# Patient Record
Sex: MALE | Race: WHITE | Employment: FULL TIME | URBAN - METROPOLITAN AREA
[De-identification: names, ages, dates, MRNs, and addresses within clinical notes are randomized per-mention and may not be internally consistent; named-entity substitution may affect disease eponyms.]

---

## 2020-02-15 ENCOUNTER — APPOINTMENT (OUTPATIENT)
Dept: NON INVASIVE DIAGNOSTICS | Age: 60
DRG: 836 | End: 2020-02-15
Attending: INTERNAL MEDICINE
Payer: COMMERCIAL

## 2020-02-15 ENCOUNTER — HOSPITAL ENCOUNTER (INPATIENT)
Age: 60
LOS: 3 days | Discharge: HOME | DRG: 836 | End: 2020-02-18
Attending: STUDENT IN AN ORGANIZED HEALTH CARE EDUCATION/TRAINING PROGRAM | Admitting: INTERNAL MEDICINE
Payer: COMMERCIAL

## 2020-02-15 ENCOUNTER — APPOINTMENT (OUTPATIENT)
Dept: GENERAL RADIOLOGY | Age: 60
DRG: 836 | End: 2020-02-15
Attending: STUDENT IN AN ORGANIZED HEALTH CARE EDUCATION/TRAINING PROGRAM
Payer: COMMERCIAL

## 2020-02-15 ENCOUNTER — APPOINTMENT (OUTPATIENT)
Dept: CT IMAGING | Age: 60
DRG: 836 | End: 2020-02-15
Attending: INTERNAL MEDICINE
Payer: COMMERCIAL

## 2020-02-15 DIAGNOSIS — R16.1 SPLENOMEGALY: ICD-10-CM

## 2020-02-15 DIAGNOSIS — D69.6 THROMBOCYTOPENIA (HCC): ICD-10-CM

## 2020-02-15 DIAGNOSIS — D64.9 SYMPTOMATIC ANEMIA: Primary | ICD-10-CM

## 2020-02-15 DIAGNOSIS — D64.9 ANEMIA, UNSPECIFIED TYPE: ICD-10-CM

## 2020-02-15 LAB
ALBUMIN SERPL-MCNC: 3.7 G/DL (ref 3.5–5)
ALBUMIN/GLOB SERPL: 1 {RATIO} (ref 1.1–2.2)
ALP SERPL-CCNC: 89 U/L (ref 45–117)
ALT SERPL-CCNC: 37 U/L (ref 12–78)
ANION GAP SERPL CALC-SCNC: 6 MMOL/L (ref 5–15)
AST SERPL-CCNC: 19 U/L (ref 15–37)
B PERT DNA SPEC QL NAA+PROBE: NOT DETECTED
BASOPHILS # BLD: 0 K/UL (ref 0–0.1)
BASOPHILS # BLD: 0 K/UL (ref 0–0.1)
BASOPHILS NFR BLD: 0 % (ref 0–1)
BASOPHILS NFR BLD: 0 % (ref 0–1)
BILIRUB DIRECT SERPL-MCNC: 0.3 MG/DL (ref 0–0.2)
BILIRUB INDIRECT SERPL-MCNC: 0.7 MG/DL (ref 0–1.1)
BILIRUB SERPL-MCNC: 1 MG/DL (ref 0.2–1)
BILIRUB SERPL-MCNC: 1.2 MG/DL (ref 0.2–1)
BNP SERPL-MCNC: 316 PG/ML
BORDETELLA PARAPERTUSSIS PCR, BORPAR: NOT DETECTED
BUN SERPL-MCNC: 16 MG/DL (ref 6–20)
BUN/CREAT SERPL: 13 (ref 12–20)
C PNEUM DNA SPEC QL NAA+PROBE: NOT DETECTED
CALCIUM SERPL-MCNC: 8.5 MG/DL (ref 8.5–10.1)
CHLORIDE SERPL-SCNC: 109 MMOL/L (ref 97–108)
CO2 SERPL-SCNC: 24 MMOL/L (ref 21–32)
COMMENT, HOLDF: NORMAL
COMMENT, HOLDF: NORMAL
CREAT SERPL-MCNC: 1.19 MG/DL (ref 0.7–1.3)
D DIMER PPP FEU-MCNC: 1.62 MG/L FEU (ref 0–0.65)
DIFFERENTIAL METHOD BLD: ABNORMAL
DIFFERENTIAL METHOD BLD: ABNORMAL
EOSINOPHIL # BLD: 0 K/UL (ref 0–0.4)
EOSINOPHIL # BLD: 0 K/UL (ref 0–0.4)
EOSINOPHIL NFR BLD: 0 % (ref 0–7)
EOSINOPHIL NFR BLD: 0 % (ref 0–7)
ERYTHROCYTE [DISTWIDTH] IN BLOOD BY AUTOMATED COUNT: 12.1 % (ref 11.5–14.5)
ERYTHROCYTE [DISTWIDTH] IN BLOOD BY AUTOMATED COUNT: 12.6 % (ref 11.5–14.5)
FERRITIN SERPL-MCNC: 597 NG/ML (ref 26–388)
FLUAV H1 2009 PAND RNA SPEC QL NAA+PROBE: NOT DETECTED
FLUAV H1 RNA SPEC QL NAA+PROBE: NOT DETECTED
FLUAV H3 RNA SPEC QL NAA+PROBE: NOT DETECTED
FLUAV SUBTYP SPEC NAA+PROBE: NOT DETECTED
FLUBV RNA SPEC QL NAA+PROBE: NOT DETECTED
FOLATE SERPL-MCNC: 6.3 NG/ML (ref 5–21)
GLOBULIN SER CALC-MCNC: 3.6 G/DL (ref 2–4)
GLUCOSE SERPL-MCNC: 153 MG/DL (ref 65–100)
HADV DNA SPEC QL NAA+PROBE: NOT DETECTED
HAPTOGLOB SERPL-MCNC: 179 MG/DL (ref 30–200)
HCOV 229E RNA SPEC QL NAA+PROBE: NOT DETECTED
HCOV HKU1 RNA SPEC QL NAA+PROBE: NOT DETECTED
HCOV NL63 RNA SPEC QL NAA+PROBE: NOT DETECTED
HCOV OC43 RNA SPEC QL NAA+PROBE: NOT DETECTED
HCT VFR BLD AUTO: 15.9 % (ref 36.6–50.3)
HCT VFR BLD AUTO: 16.5 % (ref 36.6–50.3)
HEMOCCULT STL QL: NEGATIVE
HGB BLD-MCNC: 5.5 G/DL (ref 12.1–17)
HGB BLD-MCNC: 5.7 G/DL (ref 12.1–17)
HMPV RNA SPEC QL NAA+PROBE: NOT DETECTED
HPIV1 RNA SPEC QL NAA+PROBE: NOT DETECTED
HPIV2 RNA SPEC QL NAA+PROBE: NOT DETECTED
HPIV3 RNA SPEC QL NAA+PROBE: NOT DETECTED
HPIV4 RNA SPEC QL NAA+PROBE: NOT DETECTED
IMM GRANULOCYTES # BLD AUTO: 0 K/UL
IMM GRANULOCYTES # BLD AUTO: 0 K/UL (ref 0–0.04)
IMM GRANULOCYTES NFR BLD AUTO: 0 %
IMM GRANULOCYTES NFR BLD AUTO: 0 % (ref 0–0.5)
INR PPP: 1.3 (ref 0.9–1.1)
IRON SATN MFR SERPL: 62 % (ref 20–50)
IRON SERPL-MCNC: 123 UG/DL (ref 35–150)
LDH SERPL L TO P-CCNC: 374 U/L (ref 85–241)
LYMPHOCYTES # BLD: 0.7 K/UL (ref 0.8–3.5)
LYMPHOCYTES # BLD: 0.9 K/UL (ref 0.8–3.5)
LYMPHOCYTES NFR BLD: 14 % (ref 12–49)
LYMPHOCYTES NFR BLD: 18 % (ref 12–49)
M PNEUMO DNA SPEC QL NAA+PROBE: NOT DETECTED
MAGNESIUM SERPL-MCNC: 2.1 MG/DL (ref 1.6–2.4)
MCH RBC QN AUTO: 30.6 PG (ref 26–34)
MCH RBC QN AUTO: 31.1 PG (ref 26–34)
MCHC RBC AUTO-ENTMCNC: 34.5 G/DL (ref 30–36.5)
MCHC RBC AUTO-ENTMCNC: 34.6 G/DL (ref 30–36.5)
MCV RBC AUTO: 88.3 FL (ref 80–99)
MCV RBC AUTO: 90.2 FL (ref 80–99)
MONOCYTES # BLD: 0.3 K/UL (ref 0–1)
MONOCYTES # BLD: 0.7 K/UL (ref 0–1)
MONOCYTES NFR BLD: 14 % (ref 5–13)
MONOCYTES NFR BLD: 7 % (ref 5–13)
NEUTS SEG # BLD: 3.5 K/UL (ref 1.8–8)
NEUTS SEG # BLD: 3.9 K/UL (ref 1.8–8)
NEUTS SEG NFR BLD: 68 % (ref 32–75)
NEUTS SEG NFR BLD: 79 % (ref 32–75)
NRBC # BLD: 0 K/UL (ref 0–0.01)
NRBC # BLD: 0 K/UL (ref 0–0.01)
NRBC BLD-RTO: 0 PER 100 WBC
NRBC BLD-RTO: 0 PER 100 WBC
PLATELET # BLD AUTO: 21 K/UL (ref 150–400)
PLATELET # BLD AUTO: 25 K/UL (ref 150–400)
PMV BLD AUTO: 11 FL (ref 8.9–12.9)
PMV BLD AUTO: 11.6 FL (ref 8.9–12.9)
POTASSIUM SERPL-SCNC: 3.8 MMOL/L (ref 3.5–5.1)
PROT SERPL-MCNC: 7.3 G/DL (ref 6.4–8.2)
PROTHROMBIN TIME: 13.2 SEC (ref 9–11.1)
RBC # BLD AUTO: 1.8 M/UL (ref 4.1–5.7)
RBC # BLD AUTO: 1.83 M/UL (ref 4.1–5.7)
RBC MORPH BLD: ABNORMAL
RBC MORPH BLD: ABNORMAL
RSV RNA SPEC QL NAA+PROBE: NOT DETECTED
RV+EV RNA SPEC QL NAA+PROBE: NOT DETECTED
SAMPLES BEING HELD,HOLD: NORMAL
SAMPLES BEING HELD,HOLD: NORMAL
SODIUM SERPL-SCNC: 139 MMOL/L (ref 136–145)
TIBC SERPL-MCNC: 199 UG/DL (ref 250–450)
TROPONIN I SERPL-MCNC: <0.05 NG/ML
VIT B12 SERPL-MCNC: 1817 PG/ML (ref 193–986)
WBC # BLD AUTO: 4.9 K/UL (ref 4.1–11.1)
WBC # BLD AUTO: 5.1 K/UL (ref 4.1–11.1)

## 2020-02-15 PROCEDURE — 82272 OCCULT BLD FECES 1-3 TESTS: CPT

## 2020-02-15 PROCEDURE — 83010 ASSAY OF HAPTOGLOBIN QUANT: CPT

## 2020-02-15 PROCEDURE — 83540 ASSAY OF IRON: CPT

## 2020-02-15 PROCEDURE — 85025 COMPLETE CBC W/AUTO DIFF WBC: CPT

## 2020-02-15 PROCEDURE — 83880 ASSAY OF NATRIURETIC PEPTIDE: CPT

## 2020-02-15 PROCEDURE — 93306 TTE W/DOPPLER COMPLETE: CPT

## 2020-02-15 PROCEDURE — 74011000250 HC RX REV CODE- 250: Performed by: STUDENT IN AN ORGANIZED HEALTH CARE EDUCATION/TRAINING PROGRAM

## 2020-02-15 PROCEDURE — 82728 ASSAY OF FERRITIN: CPT

## 2020-02-15 PROCEDURE — 82248 BILIRUBIN DIRECT: CPT

## 2020-02-15 PROCEDURE — 36430 TRANSFUSION BLD/BLD COMPNT: CPT

## 2020-02-15 PROCEDURE — 85379 FIBRIN DEGRADATION QUANT: CPT

## 2020-02-15 PROCEDURE — 84484 ASSAY OF TROPONIN QUANT: CPT

## 2020-02-15 PROCEDURE — 74011636320 HC RX REV CODE- 636/320: Performed by: RADIOLOGY

## 2020-02-15 PROCEDURE — 86923 COMPATIBILITY TEST ELECTRIC: CPT

## 2020-02-15 PROCEDURE — 36415 COLL VENOUS BLD VENIPUNCTURE: CPT

## 2020-02-15 PROCEDURE — 80053 COMPREHEN METABOLIC PANEL: CPT

## 2020-02-15 PROCEDURE — 99285 EMERGENCY DEPT VISIT HI MDM: CPT

## 2020-02-15 PROCEDURE — 82607 VITAMIN B-12: CPT

## 2020-02-15 PROCEDURE — C9113 INJ PANTOPRAZOLE SODIUM, VIA: HCPCS | Performed by: STUDENT IN AN ORGANIZED HEALTH CARE EDUCATION/TRAINING PROGRAM

## 2020-02-15 PROCEDURE — 86900 BLOOD TYPING SEROLOGIC ABO: CPT

## 2020-02-15 PROCEDURE — P9035 PLATELET PHERES LEUKOREDUCED: HCPCS

## 2020-02-15 PROCEDURE — P9016 RBC LEUKOCYTES REDUCED: HCPCS

## 2020-02-15 PROCEDURE — 74011250636 HC RX REV CODE- 250/636: Performed by: STUDENT IN AN ORGANIZED HEALTH CARE EDUCATION/TRAINING PROGRAM

## 2020-02-15 PROCEDURE — 85610 PROTHROMBIN TIME: CPT

## 2020-02-15 PROCEDURE — 74178 CT ABD&PLV WO CNTR FLWD CNTR: CPT

## 2020-02-15 PROCEDURE — 74011000250 HC RX REV CODE- 250: Performed by: INTERNAL MEDICINE

## 2020-02-15 PROCEDURE — 96375 TX/PRO/DX INJ NEW DRUG ADDON: CPT

## 2020-02-15 PROCEDURE — C9113 INJ PANTOPRAZOLE SODIUM, VIA: HCPCS | Performed by: INTERNAL MEDICINE

## 2020-02-15 PROCEDURE — 0100U RESPIRATORY PANEL,PCR,NASOPHARYNGEAL: CPT

## 2020-02-15 PROCEDURE — 86038 ANTINUCLEAR ANTIBODIES: CPT

## 2020-02-15 PROCEDURE — 74011000258 HC RX REV CODE- 258: Performed by: RADIOLOGY

## 2020-02-15 PROCEDURE — 96374 THER/PROPH/DIAG INJ IV PUSH: CPT

## 2020-02-15 PROCEDURE — 71046 X-RAY EXAM CHEST 2 VIEWS: CPT

## 2020-02-15 PROCEDURE — 83735 ASSAY OF MAGNESIUM: CPT

## 2020-02-15 PROCEDURE — 74011250637 HC RX REV CODE- 250/637: Performed by: INTERNAL MEDICINE

## 2020-02-15 PROCEDURE — 82746 ASSAY OF FOLIC ACID SERUM: CPT

## 2020-02-15 PROCEDURE — 83615 LACTATE (LD) (LDH) ENZYME: CPT

## 2020-02-15 PROCEDURE — 74011250636 HC RX REV CODE- 250/636: Performed by: INTERNAL MEDICINE

## 2020-02-15 PROCEDURE — 65660000000 HC RM CCU STEPDOWN

## 2020-02-15 PROCEDURE — 93005 ELECTROCARDIOGRAM TRACING: CPT

## 2020-02-15 PROCEDURE — 30233N1 TRANSFUSION OF NONAUTOLOGOUS RED BLOOD CELLS INTO PERIPHERAL VEIN, PERCUTANEOUS APPROACH: ICD-10-PCS | Performed by: INTERNAL MEDICINE

## 2020-02-15 RX ORDER — TRAMADOL HYDROCHLORIDE 50 MG/1
50 TABLET ORAL
Status: DISCONTINUED | OUTPATIENT
Start: 2020-02-15 | End: 2020-02-18 | Stop reason: HOSPADM

## 2020-02-15 RX ORDER — ACETAMINOPHEN 325 MG/1
650 TABLET ORAL
Status: DISCONTINUED | OUTPATIENT
Start: 2020-02-15 | End: 2020-02-18 | Stop reason: HOSPADM

## 2020-02-15 RX ORDER — ONDANSETRON 2 MG/ML
4 INJECTION INTRAMUSCULAR; INTRAVENOUS
Status: DISCONTINUED | OUTPATIENT
Start: 2020-02-15 | End: 2020-02-18 | Stop reason: HOSPADM

## 2020-02-15 RX ORDER — SODIUM CHLORIDE 0.9 % (FLUSH) 0.9 %
10 SYRINGE (ML) INJECTION
Status: COMPLETED | OUTPATIENT
Start: 2020-02-15 | End: 2020-02-15

## 2020-02-15 RX ORDER — SODIUM CHLORIDE 9 MG/ML
75 INJECTION, SOLUTION INTRAVENOUS CONTINUOUS
Status: DISCONTINUED | OUTPATIENT
Start: 2020-02-15 | End: 2020-02-18

## 2020-02-15 RX ORDER — DORZOLAMIDE HCL 20 MG/ML
1 SOLUTION/ DROPS OPHTHALMIC 3 TIMES DAILY
Status: DISCONTINUED | OUTPATIENT
Start: 2020-02-15 | End: 2020-02-18 | Stop reason: HOSPADM

## 2020-02-15 RX ORDER — MELOXICAM 15 MG/1
15 TABLET ORAL DAILY
COMMUNITY
End: 2020-02-18

## 2020-02-15 RX ORDER — SODIUM CHLORIDE 9 MG/ML
250 INJECTION, SOLUTION INTRAVENOUS AS NEEDED
Status: DISCONTINUED | OUTPATIENT
Start: 2020-02-15 | End: 2020-02-18 | Stop reason: HOSPADM

## 2020-02-15 RX ORDER — NEBIVOLOL 2.5 MG/1
5 TABLET ORAL DAILY
Status: DISCONTINUED | OUTPATIENT
Start: 2020-02-16 | End: 2020-02-18 | Stop reason: HOSPADM

## 2020-02-15 RX ORDER — TIMOLOL MALEATE 5 MG/ML
1 SOLUTION/ DROPS OPHTHALMIC 2 TIMES DAILY
Status: DISCONTINUED | OUTPATIENT
Start: 2020-02-15 | End: 2020-02-18 | Stop reason: HOSPADM

## 2020-02-15 RX ORDER — ONDANSETRON 2 MG/ML
4 INJECTION INTRAMUSCULAR; INTRAVENOUS
Status: COMPLETED | OUTPATIENT
Start: 2020-02-15 | End: 2020-02-15

## 2020-02-15 RX ORDER — NEBIVOLOL 5 MG/1
5 TABLET ORAL DAILY
COMMUNITY

## 2020-02-15 RX ORDER — SODIUM CHLORIDE 0.9 % (FLUSH) 0.9 %
5-40 SYRINGE (ML) INJECTION EVERY 8 HOURS
Status: DISCONTINUED | OUTPATIENT
Start: 2020-02-15 | End: 2020-02-18 | Stop reason: HOSPADM

## 2020-02-15 RX ORDER — SODIUM CHLORIDE 0.9 % (FLUSH) 0.9 %
5-40 SYRINGE (ML) INJECTION AS NEEDED
Status: DISCONTINUED | OUTPATIENT
Start: 2020-02-15 | End: 2020-02-18 | Stop reason: HOSPADM

## 2020-02-15 RX ORDER — TRAMADOL HYDROCHLORIDE 50 MG/1
50 TABLET ORAL
COMMUNITY

## 2020-02-15 RX ADMIN — ACETAMINOPHEN 650 MG: 325 TABLET ORAL at 15:25

## 2020-02-15 RX ADMIN — SODIUM CHLORIDE, SODIUM LACTATE, POTASSIUM CHLORIDE, AND CALCIUM CHLORIDE 1000 ML: 600; 310; 30; 20 INJECTION, SOLUTION INTRAVENOUS at 09:59

## 2020-02-15 RX ADMIN — Medication 10 ML: at 21:27

## 2020-02-15 RX ADMIN — SODIUM CHLORIDE 40 MG: 9 INJECTION INTRAMUSCULAR; INTRAVENOUS; SUBCUTANEOUS at 21:26

## 2020-02-15 RX ADMIN — SODIUM CHLORIDE 80 MG: 9 INJECTION INTRAMUSCULAR; INTRAVENOUS; SUBCUTANEOUS at 10:01

## 2020-02-15 RX ADMIN — SODIUM CHLORIDE 75 ML/HR: 900 INJECTION, SOLUTION INTRAVENOUS at 18:48

## 2020-02-15 RX ADMIN — Medication 10 ML: at 11:45

## 2020-02-15 RX ADMIN — DORZOLAMIDE HYDROCHLORIDE 1 DROP: 20 SOLUTION/ DROPS OPHTHALMIC at 16:27

## 2020-02-15 RX ADMIN — DORZOLAMIDE HYDROCHLORIDE 1 DROP: 20 SOLUTION/ DROPS OPHTHALMIC at 21:32

## 2020-02-15 RX ADMIN — ONDANSETRON 4 MG: 2 INJECTION INTRAMUSCULAR; INTRAVENOUS at 10:00

## 2020-02-15 RX ADMIN — IOPAMIDOL 100 ML: 755 INJECTION, SOLUTION INTRAVENOUS at 11:45

## 2020-02-15 RX ADMIN — SODIUM CHLORIDE 100 ML: 900 INJECTION, SOLUTION INTRAVENOUS at 11:45

## 2020-02-15 NOTE — H&P
HISTORY AND PHYSICAL      PCP: iB Razo MD  History source: Patient and his wife      CC: Shortness of breath      HPI: A 62year old male patient with PMH of HTN and arthritis presented to ED for progressive worsening of shortness since 1 month. Patient has been sob more than 1 month which has significantly worsened since last week. He is sob with minimal exertion. Some chest discomfort with sob and he becomes tachycardiac. C/o nausea since 1 month and poor appetite. He was off training class in [place] this last week, initially constipated and used OTC laxative and he had 2-3 episodes of diarrhea which are dark colored ( but he takes iron supplements). He lost appetite this week. He has small skin cut few days ago which bled a lot. No similar episodes with bleeding in the past. Denied any bruising or purpura. His father had hx ITP at age 63's. He denied abd pain, hematuria, dizziness or weakness. He does take meloxicam daily and Advil once a week for headache. He has last physical exam done 1 year ago and lab work was normal. He is due for colonoscopy this year  In ED, blood work showed hb 5.7 and platelets 21. 1U PRBC and platelets ordered. GI stat consulted. He was given PPI and zofran. Hospitalist consulted for admission      PMH/PSH:  HTN  Arthritis  Knee replacement  Hernia repair     Home meds:   Prior to Admission medications    Medication Sig Start Date End Date Taking? Authorizing Provider   nebivolol (BYSTOLIC) 5 mg tablet Take 5 mg by mouth daily. Indications: high blood pressure   Yes Provider, Historical   traMADol (ULTRAM) 50 mg tablet Take 50 mg by mouth every six (6) hours as needed for Pain. Yes Provider, Historical   meloxicam (MOBIC) 15 mg tablet Take 15 mg by mouth daily. Yes Provider, Historical   dorzolamide HCl/timolol maleat (DORZOLAMIDE-TIMOLOL OP) Apply 1 Drop to eye two (2) times a day.  One drop each eye 2.23%/0.68%   Yes Provider, Historical Allergies: Allergies   Allergen Reactions    Codeine Nausea and Vomiting       FH:  History reviewed. No pertinent family history. father has hx ITP and passed away with Metastatic prostate cancer. No other bleeding disorders     SH:  Social History     Tobacco Use    Smoking status: Former Smoker    Smokeless tobacco: Never Used   Substance Use Topics    Alcohol use: Yes     Frequency: Never     Comment: Rare       ROS: A comprehensive review of systems was negative except for that written in the HPI. PHYSICAL EXAM:  Visit Vitals  /75   Pulse (!) 102   Temp 98.7 °F (37.1 °C)   Resp 23   Ht 6' 3\" (1.905 m)   Wt 104.8 kg (231 lb)   SpO2 100%   BMI 28.87 kg/m²       Gen: NAD  HEENT: anicteric sclerae, normal conjunctiva, oropharynx clear, MM moist  Neck: supple, trachea midline, no adenopathy  Heart: RRR, no MRG, no JVD, no peripheral edema  Lungs: CTA b/l, non-labored respirations  Abd: soft, NT, ND, BS+, no organomegaly, obese   Extr: warm  Skin: dry, no rash, no petechia   Neuro: CN II-XII grossly intact, normal speech, moves all extremities  Psych: normal mood, appropriate affect      Labs/Imaging:  Recent Results (from the past 24 hour(s))   CBC WITH AUTOMATED DIFF    Collection Time: 02/15/20  8:43 AM   Result Value Ref Range    WBC 4.9 4.1 - 11.1 K/uL    RBC 1.83 (L) 4.10 - 5.70 M/uL    HGB 5.7 (LL) 12.1 - 17.0 g/dL    HCT 16.5 (LL) 36.6 - 50.3 %    MCV 90.2 80.0 - 99.0 FL    MCH 31.1 26.0 - 34.0 PG    MCHC 34.5 30.0 - 36.5 g/dL    RDW 12.1 11.5 - 14.5 %    PLATELET 21 (LL) 668 - 400 K/uL    MPV 11.6 8.9 - 12.9 FL    NRBC 0.0 0  WBC    ABSOLUTE NRBC 0.00 0.00 - 0.01 K/uL    NEUTROPHILS 79 (H) 32 - 75 %    LYMPHOCYTES 14 12 - 49 %    MONOCYTES 7 5 - 13 %    EOSINOPHILS 0 0 - 7 %    BASOPHILS 0 0 - 1 %    IMMATURE GRANULOCYTES 0 %    ABS. NEUTROPHILS 3.9 1.8 - 8.0 K/UL    ABS. LYMPHOCYTES 0.7 (L) 0.8 - 3.5 K/UL    ABS. MONOCYTES 0.3 0.0 - 1.0 K/UL    ABS.  EOSINOPHILS 0.0 0.0 - 0.4 K/UL    ABS. BASOPHILS 0.0 0.0 - 0.1 K/UL    ABS. IMM. GRANS. 0.0 K/UL    DF MANUAL      RBC COMMENTS MICROCYTOSIS  1+       SAMPLES BEING HELD    Collection Time: 02/15/20  8:43 AM   Result Value Ref Range    SAMPLES BEING HELD 1red     COMMENT        Add-on orders for these samples will be processed based on acceptable specimen integrity and analyte stability, which may vary by analyte. MAGNESIUM    Collection Time: 02/15/20  8:43 AM   Result Value Ref Range    Magnesium 2.1 1.6 - 2.4 mg/dL   NT-PRO BNP    Collection Time: 02/15/20  8:43 AM   Result Value Ref Range    NT pro- (H) <125 PG/ML   TROPONIN I    Collection Time: 02/15/20  8:43 AM   Result Value Ref Range    Troponin-I, Qt. <0.05 <0.05 ng/mL   PROTHROMBIN TIME + INR    Collection Time: 02/15/20  8:43 AM   Result Value Ref Range    INR 1.3 (H) 0.9 - 1.1      Prothrombin time 13.2 (H) 9.0 - 86.2 sec   METABOLIC PANEL, COMPREHENSIVE    Collection Time: 02/15/20  8:43 AM   Result Value Ref Range    Sodium 139 136 - 145 mmol/L    Potassium 3.8 3.5 - 5.1 mmol/L    Chloride 109 (H) 97 - 108 mmol/L    CO2 24 21 - 32 mmol/L    Anion gap 6 5 - 15 mmol/L    Glucose 153 (H) 65 - 100 mg/dL    BUN 16 6 - 20 MG/DL    Creatinine 1.19 0.70 - 1.30 MG/DL    BUN/Creatinine ratio 13 12 - 20      GFR est AA >60 >60 ml/min/1.73m2    GFR est non-AA >60 >60 ml/min/1.73m2    Calcium 8.5 8.5 - 10.1 MG/DL    Bilirubin, total 1.2 (H) 0.2 - 1.0 MG/DL    ALT (SGPT) 37 12 - 78 U/L    AST (SGOT) 19 15 - 37 U/L    Alk.  phosphatase 89 45 - 117 U/L    Protein, total 7.3 6.4 - 8.2 g/dL    Albumin 3.7 3.5 - 5.0 g/dL    Globulin 3.6 2.0 - 4.0 g/dL    A-G Ratio 1.0 (L) 1.1 - 2.2     TYPE & SCREEN    Collection Time: 02/15/20  8:43 AM   Result Value Ref Range    Crossmatch Expiration 02/18/2020     ABO/Rh(D) Valeria Garcia POSITIVE     Antibody screen NEG    EKG, 12 LEAD, INITIAL    Collection Time: 02/15/20  8:45 AM   Result Value Ref Range    Ventricular Rate 99 BPM    Atrial Rate 99 BPM    P-R Interval 146 ms    QRS Duration 88 ms    Q-T Interval 380 ms    QTC Calculation (Bezet) 487 ms    Calculated P Axis 15 degrees    Calculated R Axis 12 degrees    Calculated T Axis 11 degrees    Diagnosis       Normal sinus rhythm  Prolonged QT  No previous ECGs available     OCCULT BLOOD, STOOL    Collection Time: 02/15/20  9:12 AM   Result Value Ref Range    Occult blood, stool NEGATIVE  NEG         Recent Labs     02/15/20  0843   WBC 4.9   HGB 5.7*   HCT 16.5*   PLT 21*     Recent Labs     02/15/20  0843      K 3.8   *   CO2 24   BUN 16   CREA 1.19   *   CA 8.5   MG 2.1     Recent Labs     02/15/20  0843   SGOT 19   ALT 37   AP 89   TBILI 1.2*   TP 7.3   ALB 3.7   GLOB 3.6       Recent Labs     02/15/20  0843   TROIQ <0.05       Recent Labs     02/15/20  0843   INR 1.3*   PTP 13.2*        No results for input(s): PH, PCO2, PO2 in the last 72 hours. EKG: NSR    All labs and imaging personally reviewed by me. Assessment & Plan:   Acute symptomatic anemia  - admit to telemetry  - pt presented with sob  - hb 5.7. 1U PRBC ordered  - will monitor Hb closely  - Differentials: GI bleed vs hematological  - elevated Vit b12, normal folate and iron levels  - fobt negative   - peripheral smear, hepatitis panel, haptoglobin ordered   - CT abd: No active gastrointestinal extravasation identified. colonic diverticulosis. - GI consulted  - NPO, IVF, IV PPI bid    Thrombocytopenia 21 on poa  - 1U platelets transfusion today  - peripheral smear ordered  - hematology consulted  - will monitor    HTN - BP stable. C/w home meds Bystolic   Arthritis - home meds tramadol prn. meloxicam on hold     Ambulation: independent  DVT ppx: SCDs  Code status: Full. Wife is NOK  Disposition: TBD.  Home when ready     Signed By: Jose Larkin MD     February 15, 2020

## 2020-02-15 NOTE — ROUTINE PROCESS
TRANSFER - OUT REPORT: 
 
Verbal report given to Vidya(name) on Tariq Angel  being transferred to  
(unit) for routine progression of care Report consisted of patients Situation, Background, Assessment and  
Recommendations(SBAR). Information from the following report(s) ED Summary and Recent Results was reviewed with the receiving nurse. Lines:  
Peripheral IV 02/15/20 Left Antecubital (Active) Site Assessment Clean, dry, & intact 2/15/2020  8:55 AM  
Phlebitis Assessment 0 2/15/2020  8:55 AM  
Infiltration Assessment 0 2/15/2020  8:55 AM  
Dressing Status Clean, dry, & intact 2/15/2020  8:55 AM  
Dressing Type Transparent 2/15/2020  8:55 AM  
Hub Color/Line Status Pink;Flushed;Capped 2/15/2020  8:55 AM  
Action Taken Blood drawn 2/15/2020  8:55 AM  
  
 
Opportunity for questions and clarification was provided. Patient transported with: 
 Monitor Registered Nurse

## 2020-02-15 NOTE — ED NOTES
Purposeful rounding completed. Ongoing plan of care discussed and pts concerns/questions addressed. Pain reassessed. Pt informed of time factors with lab/imaging study results. Pt resting on the stretcher in a position of comfort. Call bell within reach; will continue to monitor.

## 2020-02-15 NOTE — CONSULTS
[facility] [place] at [address], [address], [address] [facility]: [phone]  F: [phone]    Reason for Visit:   Mitzi Barros is a 62 y.o. male who is seen in consultation at the request of Dr. Lorie Graves for evaluation of anemia and thrombocytopenia. Treatment History:   · none    History of Present Illness: The patient is a very pleasant 15-year-old gentleman who presents with a least a 1 week history of increasing fatigue he did start some iron pills about 6 months ago which makes him wonder if things really did not start much before the present time but over the last week he is gotten much more symptomatic and that when he got back from [place] on Friday night he just knew he had to go to the emergency room and get some help at which time he was found to be significantly anemic with thrombocytopenia and for that reason was admitted. The patient has not had any bleeding or bruising. Patient has not had any hematemesis melena hematochezia hemoptysis or hematuria. He has had some dark stools and they were thought to be dark because of his iron supplementation. He has had some constipation and has had to use a suppository. The patient in general has done very well he considers himself to be very healthy he continues to work full-time he recently changed jobs and his insurance changed as well he is last physical exam was about a year ago time. The patient has lost a little bit of weight he has been trying to get lost about 4 pounds and then in the last week he really ate very little so he lost more over the last week. He denied any other symptomatology denies any other GI  musculoskeletal respiratory or cardiac issues. History reviewed. No pertinent past medical history. History reviewed. No pertinent surgical history.    Social History     Tobacco Use    Smoking status: Former Smoker    Smokeless tobacco: Never Used   Substance Use Topics    Alcohol use: Yes     Frequency: Never     Comment: Rare      History reviewed. No pertinent family history. Current Facility-Administered Medications   Medication Dose Route Frequency    0.9% sodium chloride infusion 250 mL  250 mL IntraVENous PRN    sodium chloride (NS) flush 5-40 mL  5-40 mL IntraVENous Q8H    sodium chloride (NS) flush 5-40 mL  5-40 mL IntraVENous PRN    0.9% sodium chloride infusion  75 mL/hr IntraVENous CONTINUOUS    acetaminophen (TYLENOL) tablet 650 mg  650 mg Oral Q4H PRN    ondansetron (ZOFRAN) injection 4 mg  4 mg IntraVENous Q4H PRN    pantoprazole (PROTONIX) 40 mg in 0.9% sodium chloride 10 mL injection  40 mg IntraVENous Q12H    [START ON 2/16/2020] nebivolol (BYSTOLIC) tablet 5 mg  5 mg Oral DAILY    traMADol (ULTRAM) tablet 50 mg  50 mg Oral Q6H PRN    dorzolamide (TRUSOPT) 2 % ophthalmic solution 1 Drop  1 Drop Both Eyes TID    And    timolol (TIMOPTIC) 0.5 % ophthalmic solution 1 Drop  1 Drop Both Eyes BID      Allergies   Allergen Reactions    Codeine Nausea and Vomiting        Review of Systems: A complete review of systems was obtained, negative except as described above. Physical Exam:     Visit Vitals  /76 (BP 1 Location: Right arm, BP Patient Position: At rest)   Pulse 96   Temp 99.7 °F (37.6 °C)   Resp 18   Ht 6' 3\" (1.905 m)   Wt 231 lb (104.8 kg)   SpO2 100%   BMI 28.87 kg/m²     ECOG PS: 0  General: No distress  Eyes: PERRLA, anicteric sclerae  HENT: Atraumatic, OP clear  Neck: Supple  Lymphatic: No cervical, supraclavicular, or inguinal adenopathy  Respiratory: CTAB, normal respiratory effort  CV: Normal rate, regular rhythm, no murmurs, no peripheral edema  GI: Soft, nontender, nondistended, no masses, no hepatomegaly, no splenomegaly  MS: Digits without clubbing or cyanosis. Skin: No rashes, ecchymoses, or petechiae. Normal temperature, turgor, and texture.   Psych: Alert, oriented, appropriate affect, normal judgment/insight    Results:     Lab Results   Component Value Date/Time    WBC 4.9 02/15/2020 08:43 AM    HGB 5.7 (LL) 02/15/2020 08:43 AM    HCT 16.5 (LL) 02/15/2020 08:43 AM    PLATELET 21 (LL) 01/78/3280 08:43 AM    MCV 90.2 02/15/2020 08:43 AM    ABS. NEUTROPHILS 3.9 02/15/2020 08:43 AM     Lab Results   Component Value Date/Time    Sodium 139 02/15/2020 08:43 AM    Potassium 3.8 02/15/2020 08:43 AM    Chloride 109 (H) 02/15/2020 08:43 AM    CO2 24 02/15/2020 08:43 AM    Glucose 153 (H) 02/15/2020 08:43 AM    BUN 16 02/15/2020 08:43 AM    Creatinine 1.19 02/15/2020 08:43 AM    GFR est AA >60 02/15/2020 08:43 AM    GFR est non-AA >60 02/15/2020 08:43 AM    Calcium 8.5 02/15/2020 08:43 AM     Lab Results   Component Value Date/Time    Bilirubin, total 1.2 (H) 02/15/2020 08:43 AM    ALT (SGPT) 37 02/15/2020 08:43 AM    AST (SGOT) 19 02/15/2020 08:43 AM    Alk. phosphatase 89 02/15/2020 08:43 AM    Protein, total 7.3 02/15/2020 08:43 AM    Albumin 3.7 02/15/2020 08:43 AM    Globulin 3.6 02/15/2020 08:43 AM         Records reviewed and summarized above. Pathology report(s) reviewed above. Radiology report(s) reviewed above. Assessment:   1) anemia and thrombocytopenia normocytic of undetermined etiology    2) splenomegaly     Plan:     · We will go ahead with some blood work today. That will hopefully give us some answers we will get him set up for CT biopsy of his bone to get a tissue diagnosis. · We will plan on transfusing if hemoglobin less than 7.  · We will transfuse platelets if platelet count less than 10,000. I appreciate the opportunity to participate in Mr. Misbah montes.     Signed By: Naima Delong MD

## 2020-02-15 NOTE — ED TRIAGE NOTES
Pt reports having SOB, chest pain to center of chest, nausea and fatigue for he past week. Pt states the SOB is worse with exertion. Pt also reports fever on and off.

## 2020-02-15 NOTE — LETTER
[address] 
[facility] [place] [facility] [facility] 
[address] [id number] 
[phone] Work/School Note Date: 2/15/2020 To Whom It May concern: 
 
Noe Peoples was seen and treated today in the hospital from 2/15/2020 to 2/18/2020. Sincerely, Randee Osler, MD

## 2020-02-15 NOTE — ED PROVIDER NOTES
62 y.o. male with past medical history significant for HTN who presents from home via personal vehicle with chief complaint of SOB. Pt c/o worsening SOB over the past 1 month. Pt is a  and noticed symptoms of SOB while putting up fence. Pt states 2 weeks ago he was \"hardly able to get up and down. \" Pt states this has been getting worse to the point where he is \"very out of breath. \" Pt notes the following associated symptoms: nausea, dry cough, fever (102), fast heart rate, dark stools (1 week), \"slight\" chest pain, and decreased appetite (secondary to nausea - lost 7 lbs). Pt denies abdominal pain. Pt reports taking Bystolic (nebivolol), Meloxicam, 81 mg aspirin, and tramadol. There are no other acute medical concerns at this time. Social hx: Former smoker. Previous  (possible asbestos exposure). PCP: Charlette Fraire. Vance Graff MD    Note written by henrietta Jerry, as dictated by Levi José MD 8:46 AM      The history is provided by the patient. No  was used. History reviewed. No pertinent past medical history. History reviewed. No pertinent surgical history. History reviewed. No pertinent family history.     Social History     Socioeconomic History    Marital status:      Spouse name: Not on file    Number of children: Not on file    Years of education: Not on file    Highest education level: Not on file   Occupational History    Not on file   Social Needs    Financial resource strain: Not on file    Food insecurity:     Worry: Not on file     Inability: Not on file    Transportation needs:     Medical: Not on file     Non-medical: Not on file   Tobacco Use    Smoking status: Former Smoker    Smokeless tobacco: Never Used   Substance and Sexual Activity    Alcohol use: Yes     Frequency: Never     Comment: Rare    Drug use: Not on file    Sexual activity: Not on file   Lifestyle    Physical activity:     Days per week: Not on file     Minutes per session: Not on file    Stress: Not on file   Relationships    Social connections:     Talks on phone: Not on file     Gets together: Not on file     Attends Rastafarian service: Not on file     Active member of club or organization: Not on file     Attends meetings of clubs or organizations: Not on file     Relationship status: Not on file    Intimate partner violence:     Fear of current or ex partner: Not on file     Emotionally abused: Not on file     Physically abused: Not on file     Forced sexual activity: Not on file   Other Topics Concern    Not on file   Social History Narrative    Not on file         ALLERGIES: Codeine    Review of Systems   Constitutional: Positive for appetite change (decreased), fever (102) and unexpected weight change (lost 7 lbs). Negative for chills and fatigue. HENT: Negative for ear pain, sore throat and trouble swallowing. Eyes: Negative for visual disturbance. Respiratory: Positive for cough (dry) and shortness of breath. Cardiovascular: Positive for chest pain (slight) and palpitations (\"heart racing\"). Gastrointestinal: Positive for blood in stool (\"dark stools\"), nausea and vomiting (1 episode). Negative for abdominal pain. Genitourinary: Negative for dysuria. Musculoskeletal: Negative for back pain. Skin: Negative for rash. Neurological: Negative for light-headedness and headaches. Psychiatric/Behavioral: Negative for confusion. All other systems reviewed and are negative. Vitals:    02/15/20 0839 02/15/20 0915 02/15/20 0935   BP: 122/74 132/71 134/75   Pulse: (!) 103 (!) 102    Resp: 16 23    Temp: 98.7 °F (37.1 °C)     SpO2: 100% 100% 100%   Weight: 104.8 kg (231 lb)     Height: 6' 3\" (1.905 m)              Physical Exam  Vitals signs reviewed. Constitutional:       General: He is not in acute distress. HENT:      Head: Normocephalic and atraumatic.       Mouth/Throat:      Mouth: Mucous membranes are moist.      Pharynx: Oropharynx is clear. Cardiovascular:      Rate and Rhythm: Normal rate and regular rhythm. Heart sounds: Normal heart sounds. Pulmonary:      Effort: Pulmonary effort is normal.      Breath sounds: Normal breath sounds. Abdominal:      Tenderness: There is no abdominal tenderness. There is no guarding or rebound. Genitourinary:     Comments: Rectal Exam: Scant dark stool. Musculoskeletal: Normal range of motion. Skin:     General: Skin is warm and dry. Capillary Refill: Capillary refill takes less than 2 seconds. Coloration: Skin is pale. Neurological:      General: No focal deficit present. Mental Status: He is alert and oriented to person, place, and time. Psychiatric:         Mood and Affect: Mood normal.      Note written by henrietta Richmond, as dictated by Fatmata Arias MD 8:56 AM      MDM  Number of Diagnoses or Management Options  Symptomatic anemia:   Thrombocytopenia [facility]):   Diagnosis management comments: Agata Recio is a 62 y.o. male presenting with gradually worsening dyspnea on exertion, dark stool. Is on a NSAID, no excessive alcohol use. No history of GI bleed. Differential includes peptic ulcer disease, gastritis, esophagitis, diverticulosis, AVM, polyp. Course: Patient found to be thrombocytopenic and anemic, initiated blood transfusion, platelet transfusion, PPI, GI consult, hospitalist consult for admission. Dispo: Admission. Critical Care  Total time providing critical care: 30-74 minutes (33)         Procedures    ED EKG interpretation:  Rhythm: normal sinus rhythm with inferior Q wave in Lead 3 - nonspecific. Rate (approx.): 99; Axis: normal; ST/T wave: no other deviation.   Note written by henrietta Richmond, as dictated by Fatmata Arias MD 8:45 AM             Hospitalist Perfect Serve for Admission  9:52 AM    ED Room Number: ER12/12  Patient Name and age:  Agata Recio 62 y.o.  male  Working Diagnosis:   1. Symptomatic anemia    2. Thrombocytopenia ([facility] [address]      Readmission: no  Isolation Requirements:  no  Recommended Level of Care:  step down  Code Status:  Full Code  Department:[facility] Adult ED - [phone]  Other:           CONSULT NOTE:  10:16 AM Kieran Delgado MD spoke with Dr. Karlene Castañeda for Gastroenterology. Discussed available diagnostic tests and clinical findings. Dr. Ozzie Hammans will evaluate the patient.

## 2020-02-15 NOTE — CONSULTS
[address]    Name:  Josh Segura  MR#:  [id number]  :  1960  ACCOUNT #:  [de-identified]  DATE OF SERVICE:  02/15/2020    CHIEF COMPLAINT:  Severe anemia, thrombocytopenia, dark stool. HISTORY OF PRESENT ILLNESS:  A 64-year-old gentleman, past medical history of hypertension, arthritis, who noted progressive shortness of breath over the last month, associated with anorexia and an 8-pound weight loss. The patient had been complaining of some constipation as well, and after a laxative had some dark-colored stool. The patient however has been taking iron on a daily basis for the last 6 months, in an attempt to make him feel better. The patient has had no prior episodes of bleeding. He has had several colonoscopies for colon polyps. The name of that physician is unknown to him. Last colonoscopy approximately 5 years ago demonstrated several benign polyps. He had one episode of a cut to his hand that took forever to stop bleeding. No purpura. The patient has been taking Aleve and has been taking Advil and meloxicam on a fairly regular basis. He denies any significant alcohol. No intake of quinine. At his last physical a year ago, he was told he had normal CBC. The patient denies any abdominal pain, hematuria, dizziness, weakness. There is a family history of ITP in his father in his 62s. In the emergency room, the hemoglobin was 5.7 with normal indices and normal iron level, normal folate, elevated B12, platelets were 06,249. He has received his first unit of packed RBCs and is receiving platelets now. Hematology consult has been placed. PAST MEDICAL HISTORY:  Positive for hypertension, arthritis, knee arthroscopies, and hernia repair. MEDICATIONS AT HOME:  Include Bystolic, tramadol, meloxicam, and eyedrops. ALLERGIES:  CODEINE. FAMILY HISTORY:  Positive for ITP and metastatic cancer, otherwise negative. SOCIAL HISTORY:  Former smoker. Denies any significant alcohol. Thinks maybe a six-pack per year. REVIEW OF SYSTEMS:  As noted above, otherwise negative. In particular, he denies any chest pain. PHYSICAL EXAMINATION:  VITAL SIGNS:  Temperature is 98.5, pulse is 95, blood pressure is 136/76, respirations are 17. GENERAL:  Pale-appearing, middle-aged gentleman, in no acute distress. Oriented x3. SKIN:  Without petechiae or ecchymoses. HEAD, EYES, EARS, NOSE, AND THROAT:  Sclerae are anicteric. Conjunctivae are pale. Moist mucous membranes. NECK:  Supple with no adenopathy in the neck or groin. CHEST:  Clear to auscultation to percussion. HEART:  Reveals a regular rate and rhythm. ABDOMEN:  Soft with active bowel sounds. Nontender. No definite hepatosplenomegaly. Abdomen is obese. EXTREMITIES:  Without cyanosis, clubbing, or edema. Moving all of his extremities. LABORATORY DATA:  WBC is 4.9, hemoglobin is 5.9 with normal indices, platelet count is only 21,000. There is a shift of the differential to the left. PT/INR is minimally elevated at 1.3. CMP reveals a slightly elevated bilirubin of 1.2 and elevated LDH. Haptoglobin is pending. Ferritin level is elevated, iron saturation is 62. BUN-creatinine ratio is normal.  Stool occult test is negative. SARAI is pending. CT scan demonstrates no bleeding. There is colonic diverticulosis and splenomegaly. Chest, PA and lateral, is normal.    IMPRESSION:  Severe anemia without overt gastrointestinal bleeding, rule out hematologic cause, particularly in view of his platelet count of 85,053. Certainly could have a bleeding lesion of some sort, particularly upper gastrointestinal with gastric ulcer, gastritis associated with his nonsteroidal antiinflammatory drugs. The patient currently is taking Protonix. PLAN:  Recommend IV hydration, transfusion, Hematologic consultation.   Possible upper endoscopy on Monday or Tuesday depending on how he is doing, and hematologic evaluation. He well could have gastritis associated with his NSAIDs and significant blood loss associated with his thrombocytopenia.       MD MARISSA [email or web address]  D:  02/15/2020 16:04  T:  02/15/2020 18:47  JOB #:  [id number]

## 2020-02-16 LAB
ANION GAP SERPL CALC-SCNC: 5 MMOL/L (ref 5–15)
ATRIAL RATE: 99 BPM
AV VELOCITY RATIO: 0.75
BASOPHILS # BLD: 0 K/UL (ref 0–0.1)
BASOPHILS # BLD: 0 K/UL (ref 0–0.1)
BASOPHILS # BLD: 0.1 K/UL (ref 0–0.1)
BASOPHILS NFR BLD: 0 % (ref 0–1)
BASOPHILS NFR BLD: 0 % (ref 0–1)
BASOPHILS NFR BLD: 1 % (ref 0–1)
BLD PROD TYP BPU: NORMAL
BLD PROD TYP BPU: NORMAL
BPU ID: NORMAL
BPU ID: NORMAL
BUN SERPL-MCNC: 12 MG/DL (ref 6–20)
BUN/CREAT SERPL: 11 (ref 12–20)
CALCIUM SERPL-MCNC: 7.7 MG/DL (ref 8.5–10.1)
CALCULATED P AXIS, ECG09: 15 DEGREES
CALCULATED R AXIS, ECG10: 12 DEGREES
CALCULATED T AXIS, ECG11: 11 DEGREES
CHLORIDE SERPL-SCNC: 111 MMOL/L (ref 97–108)
CO2 SERPL-SCNC: 24 MMOL/L (ref 21–32)
COMMENT, HOLDF: NORMAL
CREAT SERPL-MCNC: 1.06 MG/DL (ref 0.7–1.3)
DIAGNOSIS, 93000: NORMAL
DIFFERENTIAL METHOD BLD: ABNORMAL
ECHO AO ROOT DIAM: 3.64 CM
ECHO AV AREA PEAK VELOCITY: 2.5 CM2
ECHO AV PEAK GRADIENT: 9.3 MMHG
ECHO AV PEAK VELOCITY: 152.44 CM/S
ECHO IVC SNIFF: 2.09 CM
ECHO LA MAJOR AXIS: 5.01 CM
ECHO LA TO AORTIC ROOT RATIO: 1.38
ECHO LV E' LATERAL VELOCITY: 11.08 CM/S
ECHO LV E' SEPTAL VELOCITY: 8.45 CM/S
ECHO LV INTERNAL DIMENSION DIASTOLIC: 5.36 CM (ref 4.2–5.9)
ECHO LV INTERNAL DIMENSION SYSTOLIC: 3.16 CM
ECHO LV IVSD: 0.91 CM (ref 0.6–1)
ECHO LV MASS 2D: 209.4 G (ref 88–224)
ECHO LV MASS INDEX 2D: 89.8 G/M2 (ref 49–115)
ECHO LV POSTERIOR WALL DIASTOLIC: 0.9 CM (ref 0.6–1)
ECHO LVOT DIAM: 2.07 CM
ECHO LVOT PEAK GRADIENT: 5.2 MMHG
ECHO LVOT PEAK VELOCITY: 113.6 CM/S
ECHO MV A VELOCITY: 79.32 CM/S
ECHO MV AREA PHT: 2.4 CM2
ECHO MV E DECELERATION TIME (DT): 312.2 MS
ECHO MV E VELOCITY: 84.13 CM/S
ECHO MV E/A RATIO: 1.06
ECHO MV E/E' LATERAL: 7.59
ECHO MV E/E' RATIO (AVERAGED): 8.77
ECHO MV E/E' SEPTAL: 9.96
ECHO MV PRESSURE HALF TIME (PHT): 90.5 MS
ECHO PV MAX VELOCITY: 70.29 CM/S
ECHO PV PEAK GRADIENT: 2 MMHG
ECHO RV TAPSE: 3.03 CM (ref 1.5–2)
ECHO TV REGURGITANT MAX VELOCITY: 289.98 CM/S
ECHO TV REGURGITANT PEAK GRADIENT: 33.6 MMHG
EOSINOPHIL # BLD: 0 K/UL (ref 0–0.4)
EOSINOPHIL NFR BLD: 0 % (ref 0–7)
ERYTHROCYTE [DISTWIDTH] IN BLOOD BY AUTOMATED COUNT: 12.9 % (ref 11.5–14.5)
ERYTHROCYTE [DISTWIDTH] IN BLOOD BY AUTOMATED COUNT: 13 % (ref 11.5–14.5)
ERYTHROCYTE [DISTWIDTH] IN BLOOD BY AUTOMATED COUNT: 13.2 % (ref 11.5–14.5)
GLUCOSE SERPL-MCNC: 102 MG/DL (ref 65–100)
HAV IGM SER QL: NONREACTIVE
HCT VFR BLD AUTO: 21.2 % (ref 36.6–50.3)
HCT VFR BLD AUTO: 21.4 % (ref 36.6–50.3)
HCT VFR BLD AUTO: 22.2 % (ref 36.6–50.3)
HCV AB SERPL QL IA: NONREACTIVE
HCV COMMENT,HCGAC: NORMAL
HGB BLD-MCNC: 7.4 G/DL (ref 12.1–17)
HGB BLD-MCNC: 7.5 G/DL (ref 12.1–17)
HGB BLD-MCNC: 7.7 G/DL (ref 12.1–17)
IMM GRANULOCYTES # BLD AUTO: 0 K/UL
IMM GRANULOCYTES NFR BLD AUTO: 0 %
LVFS 2D: 41.03 %
LYMPHOCYTES # BLD: 0.8 K/UL (ref 0.8–3.5)
LYMPHOCYTES # BLD: 0.9 K/UL (ref 0.8–3.5)
LYMPHOCYTES # BLD: 1 K/UL (ref 0.8–3.5)
LYMPHOCYTES NFR BLD: 16 % (ref 12–49)
LYMPHOCYTES NFR BLD: 17 % (ref 12–49)
LYMPHOCYTES NFR BLD: 18 % (ref 12–49)
MCH RBC QN AUTO: 30.3 PG (ref 26–34)
MCH RBC QN AUTO: 30.4 PG (ref 26–34)
MCH RBC QN AUTO: 31 PG (ref 26–34)
MCHC RBC AUTO-ENTMCNC: 34.6 G/DL (ref 30–36.5)
MCHC RBC AUTO-ENTMCNC: 34.7 G/DL (ref 30–36.5)
MCHC RBC AUTO-ENTMCNC: 35.4 G/DL (ref 30–36.5)
MCV RBC AUTO: 87.6 FL (ref 80–99)
MCV RBC AUTO: 87.7 FL (ref 80–99)
MCV RBC AUTO: 87.7 FL (ref 80–99)
MONOCYTES # BLD: 0.5 K/UL (ref 0–1)
MONOCYTES # BLD: 0.6 K/UL (ref 0–1)
MONOCYTES # BLD: 0.7 K/UL (ref 0–1)
MONOCYTES NFR BLD: 10 % (ref 5–13)
MONOCYTES NFR BLD: 11 % (ref 5–13)
MONOCYTES NFR BLD: 14 % (ref 5–13)
MV DEC SLOPE: 2.7
NEUTS SEG # BLD: 3.4 K/UL (ref 1.8–8)
NEUTS SEG # BLD: 3.5 K/UL (ref 1.8–8)
NEUTS SEG # BLD: 3.6 K/UL (ref 1.8–8)
NEUTS SEG NFR BLD: 68 % (ref 32–75)
NEUTS SEG NFR BLD: 71 % (ref 32–75)
NEUTS SEG NFR BLD: 74 % (ref 32–75)
NRBC # BLD: 0 K/UL (ref 0–0.01)
NRBC BLD-RTO: 0 PER 100 WBC
P-R INTERVAL, ECG05: 146 MS
PERIPHERAL SMEAR,PSM: NORMAL
PLATELET # BLD AUTO: 20 K/UL (ref 150–400)
PLATELET # BLD AUTO: 20 K/UL (ref 150–400)
PLATELET # BLD AUTO: 24 K/UL (ref 150–400)
PMV BLD AUTO: 11.2 FL (ref 8.9–12.9)
PMV BLD AUTO: 11.3 FL (ref 8.9–12.9)
PMV BLD AUTO: 11.7 FL (ref 8.9–12.9)
POTASSIUM SERPL-SCNC: 3.9 MMOL/L (ref 3.5–5.1)
Q-T INTERVAL, ECG07: 380 MS
QRS DURATION, ECG06: 88 MS
QTC CALCULATION (BEZET), ECG08: 487 MS
RBC # BLD AUTO: 2.42 M/UL (ref 4.1–5.7)
RBC # BLD AUTO: 2.44 M/UL (ref 4.1–5.7)
RBC # BLD AUTO: 2.53 M/UL (ref 4.1–5.7)
RBC MORPH BLD: ABNORMAL
RETICS # AUTO: 0.01 M/UL (ref 0.03–0.1)
RETICS/RBC NFR AUTO: 0.5 % (ref 0.7–2.1)
SAMPLES BEING HELD,HOLD: NORMAL
SODIUM SERPL-SCNC: 140 MMOL/L (ref 136–145)
STATUS OF UNIT,%ST: NORMAL
STATUS OF UNIT,%ST: NORMAL
UNIT DIVISION, %UDIV: 0
UNIT DIVISION, %UDIV: 0
VENTRICULAR RATE, ECG03: 99 BPM
WBC # BLD AUTO: 4.8 K/UL (ref 4.1–11.1)
WBC # BLD AUTO: 5 K/UL (ref 4.1–11.1)
WBC # BLD AUTO: 5.3 K/UL (ref 4.1–11.1)
WBC MORPH BLD: ABNORMAL

## 2020-02-16 PROCEDURE — 86709 HEPATITIS A IGM ANTIBODY: CPT

## 2020-02-16 PROCEDURE — 82175 ASSAY OF ARSENIC: CPT

## 2020-02-16 PROCEDURE — 83883 ASSAY NEPHELOMETRY NOT SPEC: CPT

## 2020-02-16 PROCEDURE — 83010 ASSAY OF HAPTOGLOBIN QUANT: CPT

## 2020-02-16 PROCEDURE — 74011000250 HC RX REV CODE- 250: Performed by: INTERNAL MEDICINE

## 2020-02-16 PROCEDURE — 36415 COLL VENOUS BLD VENIPUNCTURE: CPT

## 2020-02-16 PROCEDURE — 84450 TRANSFERASE (AST) (SGOT): CPT

## 2020-02-16 PROCEDURE — 65660000000 HC RM CCU STEPDOWN

## 2020-02-16 PROCEDURE — 86704 HEP B CORE ANTIBODY TOTAL: CPT

## 2020-02-16 PROCEDURE — 82172 ASSAY OF APOLIPOPROTEIN: CPT

## 2020-02-16 PROCEDURE — 80048 BASIC METABOLIC PNL TOTAL CA: CPT

## 2020-02-16 PROCEDURE — 82977 ASSAY OF GGT: CPT

## 2020-02-16 PROCEDURE — 86803 HEPATITIS C AB TEST: CPT

## 2020-02-16 PROCEDURE — 84460 ALANINE AMINO (ALT) (SGPT): CPT

## 2020-02-16 PROCEDURE — 81270 JAK2 GENE: CPT

## 2020-02-16 PROCEDURE — 74011250636 HC RX REV CODE- 250/636: Performed by: INTERNAL MEDICINE

## 2020-02-16 PROCEDURE — 85045 AUTOMATED RETICULOCYTE COUNT: CPT

## 2020-02-16 PROCEDURE — 82465 ASSAY BLD/SERUM CHOLESTEROL: CPT

## 2020-02-16 PROCEDURE — 85025 COMPLETE CBC W/AUTO DIFF WBC: CPT

## 2020-02-16 PROCEDURE — 74011250637 HC RX REV CODE- 250/637: Performed by: INTERNAL MEDICINE

## 2020-02-16 PROCEDURE — C9113 INJ PANTOPRAZOLE SODIUM, VIA: HCPCS | Performed by: INTERNAL MEDICINE

## 2020-02-16 PROCEDURE — 82668 ASSAY OF ERYTHROPOIETIN: CPT

## 2020-02-16 PROCEDURE — 84478 ASSAY OF TRIGLYCERIDES: CPT

## 2020-02-16 RX ORDER — LANOLIN ALCOHOL/MO/W.PET/CERES
3 CREAM (GRAM) TOPICAL
Status: DISCONTINUED | OUTPATIENT
Start: 2020-02-16 | End: 2020-02-18 | Stop reason: HOSPADM

## 2020-02-16 RX ADMIN — Medication 10 ML: at 06:00

## 2020-02-16 RX ADMIN — MELATONIN 3 MG: at 20:38

## 2020-02-16 RX ADMIN — SODIUM CHLORIDE 40 MG: 9 INJECTION INTRAMUSCULAR; INTRAVENOUS; SUBCUTANEOUS at 20:38

## 2020-02-16 RX ADMIN — DORZOLAMIDE HYDROCHLORIDE 1 DROP: 20 SOLUTION/ DROPS OPHTHALMIC at 20:39

## 2020-02-16 RX ADMIN — SODIUM CHLORIDE 40 MG: 9 INJECTION INTRAMUSCULAR; INTRAVENOUS; SUBCUTANEOUS at 08:24

## 2020-02-16 RX ADMIN — DORZOLAMIDE HYDROCHLORIDE 1 DROP: 20 SOLUTION/ DROPS OPHTHALMIC at 17:14

## 2020-02-16 RX ADMIN — TIMOLOL MALEATE 1 DROP: 5 SOLUTION/ DROPS OPHTHALMIC at 17:14

## 2020-02-16 RX ADMIN — NEBIVOLOL HYDROCHLORIDE 5 MG: 2.5 TABLET ORAL at 08:25

## 2020-02-16 RX ADMIN — Medication 10 ML: at 15:32

## 2020-02-16 RX ADMIN — DORZOLAMIDE HYDROCHLORIDE 1 DROP: 20 SOLUTION/ DROPS OPHTHALMIC at 08:26

## 2020-02-16 RX ADMIN — Medication 10 ML: at 20:38

## 2020-02-16 RX ADMIN — TIMOLOL MALEATE 1 DROP: 5 SOLUTION/ DROPS OPHTHALMIC at 08:26

## 2020-02-16 NOTE — PROGRESS NOTES
Hospitalist Progress Note      Hospital summary: A 62year old male patient with PMH of HTN and arthritis presented to ED for progressive worsening of shortness since 1 month. Patient has been sob more than 1 month which has significantly worsened since last week. He is sob with minimal exertion. Some chest discomfort with sob and he becomes tachycardiac. C/o nausea since 1 month and poor appetite. 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 He lost appetite this week. He has small skin cut few days ago which bled a lot. No similar episodes with bleeding in the past. Denied any bruising or purpura. His father had hx ITP at age 63's. He denied abd pain, hematuria, dizziness or weakness. He does take meloxicam daily and Advil once a week for headache. He has last physical exam done 1 year ago and lab work was normal. He is due for colonoscopy this year  In ED, blood work showed hb 5.7 and platelets 21. 1U PRBC and platelets ordered. GI stat consulted. He was given PPI and zofran. Hospitalist consulted for admission  2/15/2020      Assessment/Plan:  Acute symptomatic anemia  - pt presented with sob  - hb 5.7 on poa  - s/p 3U PRBC on 2/15. Hb 7.5 today  - will monitor Hb closely  - less likely GI bleed. High suspicion for hematological etiology   - elevated Vit b12, normal folate and iron levels  - fobt negative   - peripheral smear, SARAI,  hepatitis panel pending. haptoglobin and Hep negative  - CT abd: No active gastrointestinal extravasation identified. colonic diverticulosis. - GI and hematology on board   - IVF, IV PPI bid  - GI: \" Possible upper endoscopy on Monday or Tuesday\"  - plan for CT biopsy of bone tomorrow      Thrombocytopenia 21 on poa  -  s/p1U platelets transfusion 2/14  - hematology on board.   - will monitor, transfuse <10,000     HTN - BP stable. C/w home meds Bystolic   Arthritis - home meds tramadol prn. meloxicam on hold      Ambulation: independent  DVT ppx: SCDs  Code status: Full. Wife is NOK  Disposition: TBD. Home when ready   ----------------------------------------------    CC: sob    S: Patient is seen and examined at bedside. Wife present. He receive three units of blood transfusion yesterday. He is concerned about going back to work ( he ). Reassured him, expressed may be better to take this week off. Increased hi diet to GI lite. NPO after MN. Will check cbc q8h    Review of Systems:  A comprehensive review of systems was negative except for that written in the HPI.     O:  Visit Vitals  /70 (BP 1 Location: Right arm, BP Patient Position: At rest)   Pulse 89   Temp 98.8 °F (37.1 °C)   Resp 16   Ht 6' 3\" (1.905 m)   Wt 107 kg (235 lb 14.3 oz)   SpO2 96%   BMI 29.48 kg/m²       PHYSICAL EXAM:  Gen: NAD  HEENT: anicteric sclerae, normal conjunctiva, oropharynx clear, MM moist  Neck: supple, trachea midline, no adenopathy  Heart: RRR, no MRG, no JVD, no peripheral edema  Lungs: CTA b/l, non-labored respirations  Abd: soft, NT, ND, BS+, no organomegaly  Extr: warm  Skin: dry, no rash  Neuro: CN II-XII grossly intact, normal speech, moves all extremities  Psych: anxious       Intake/Output Summary (Last 24 hours) at 2/16/2020 1033  Last data filed at 2/16/2020 0805  Gross per 24 hour   Intake 1302.1 ml   Output    Net 1302.1 ml        Recent labs & imaging reviewed:  Recent Results (from the past 24 hour(s))   ECHO ADULT COMPLETE    Collection Time: 02/15/20  2:23 PM   Result Value Ref Range    LV E' Lateral Velocity 11.08 cm/s    LV E' Septal Velocity 8.45 cm/s    Tapse 3.03 (A) 1.5 - 2.0 cm    Ao Root D 3.64 cm    Aortic Valve Systolic Peak Velocity 303.85 cm/s    Aortic Valve Area by Continuity of Peak Velocity 2.5 cm2    AoV PG 9.3 mmHg    LVIDd 5.36 4.2 - 5.9 cm    LVPWd 0.90 0.6 - 1.0 cm    LVIDs 3.16 cm    IVSd 0.91 0.6 - 1.0 cm    LVOT d 2.07 cm    LVOT Peak Velocity 113.60 cm/s    LVOT Peak Gradient 5.2 mmHg    MVA (PHT) 2.4 cm2    MV A Jaime 79.32 cm/s    MV E Jaime 84.13 cm/s    MV E/A 1.06     Left Atrium to Aortic Root Ratio 1.38     Inferior Vena Cava Diameter Sniffing 2.09 cm    LV Mass .4 88 - 224 g    LV Mass AL Index 89.8 49 - 115 g/m2    E/E' lateral 7.59     E/E' septal 9.96     E/E' ratio (averaged) 8.77     Mitral Valve E Wave Deceleration Time 312.2 ms    Mitral Valve Pressure Half-time 90.5 ms    Left Atrium Major Axis 5.01 cm    Triscuspid Valve Regurgitation Peak Gradient 33.6 mmHg    Pulmonic Valve Max Velocity 70.29 cm/s    TR Max Velocity 289.98 cm/s    Left Ventricular Fractional Shortening by 2D 66.652478418 %    Mitral Valve Deceleration [place] [phone]     AV Velocity Ratio 0.75     PV peak gradient 2.0 mmHg   RESPIRATORY PANEL,PCR,NASOPHARYNGEAL    Collection Time: 02/15/20  5:46 PM   Result Value Ref Range    Adenovirus NOT DETECTED NOTD      Coronavirus 229E NOT DETECTED NOTD      Coronavirus HKU1 NOT DETECTED NOTD      Coronavirus CVNL63 NOT DETECTED NOTD      Coronavirus OC43 NOT DETECTED NOTD      Metapneumovirus NOT DETECTED NOTD      Rhinovirus and Enterovirus NOT DETECTED NOTD      Influenza A NOT DETECTED NOTD      Influenza A, subtype H1 NOT DETECTED NOTD      Influenza A, subtype H3 NOT DETECTED NOTD      INFLUENZA A H1N1 PCR NOT DETECTED NOTD      Influenza B NOT DETECTED NOTD      Parainfluenza 1 NOT DETECTED NOTD      Parainfluenza 2 NOT DETECTED NOTD      Parainfluenza 3 NOT DETECTED NOTD      Parainfluenza virus 4 NOT DETECTED NOTD      RSV by PCR NOT DETECTED NOTD      B. parapertussis, PCR NOT DETECTED NOTD      Bordetella pertussis - PCR NOT DETECTED NOTD      Chlamydophila pneumoniae DNA, QL, PCR NOT DETECTED NOTD      Mycoplasma pneumoniae DNA, QL, PCR NOT DETECTED NOTD     CBC WITH AUTOMATED DIFF    Collection Time: 02/15/20  5:46 PM   Result Value Ref Range    WBC 5.1 4.1 - 11.1 K/uL    RBC 1.80 (L) 4.10 - 5.70 M/uL    HGB 5.5 (LL) 12.1 - 17.0 g/dL    HCT 15.9 (LL) 36.6 - 50.3 %    MCV 88.3 80.0 - 99.0 FL    MCH 30.6 26.0 - 34.0 PG    MCHC 34.6 30.0 - 36.5 g/dL    RDW 12.6 11.5 - 14.5 %    PLATELET 25 (LL) 917 - 400 K/uL    MPV 11.0 8.9 - 12.9 FL    NRBC 0.0 0  WBC    ABSOLUTE NRBC 0.00 0.00 - 0.01 K/uL    NEUTROPHILS 68 32 - 75 %    LYMPHOCYTES 18 12 - 49 %    MONOCYTES 14 (H) 5 - 13 %    EOSINOPHILS 0 0 - 7 %    BASOPHILS 0 0 - 1 %    IMMATURE GRANULOCYTES 0 0.0 - 0.5 %    ABS. NEUTROPHILS 3.5 1.8 - 8.0 K/UL    ABS. LYMPHOCYTES 0.9 0.8 - 3.5 K/UL    ABS. MONOCYTES 0.7 0.0 - 1.0 K/UL    ABS. EOSINOPHILS 0.0 0.0 - 0.4 K/UL    ABS. BASOPHILS 0.0 0.0 - 0.1 K/UL    ABS. IMM. GRANS. 0.0 0.00 - 0.04 K/UL    DF SMEAR SCANNED      RBC COMMENTS MICROCYTOSIS  PRESENT       BILIRUBIN, FRACTIONATED    Collection Time: 02/15/20  5:46 PM   Result Value Ref Range    Bilirubin, total 1.0 0.2 - 1.0 MG/DL    Bilirubin, direct 0.3 (H) 0.0 - 0.2 MG/DL    Bilirubin, indirect 0.7 0.0 - 1.1 MG/DL   SAMPLES BEING HELD    Collection Time: 02/15/20  5:46 PM   Result Value Ref Range    SAMPLES BEING HELD 1SST     COMMENT        Add-on orders for these samples will be processed based on acceptable specimen integrity and analyte stability, which may vary by analyte. CBC WITH AUTOMATED DIFF    Collection Time: 02/16/20  4:35 AM   Result Value Ref Range    WBC 5.3 4.1 - 11.1 K/uL    RBC 2.42 (L) 4.10 - 5.70 M/uL    HGB 7.5 (L) 12.1 - 17.0 g/dL    HCT 21.2 (L) 36.6 - 50.3 %    MCV 87.6 80.0 - 99.0 FL    MCH 31.0 26.0 - 34.0 PG    MCHC 35.4 30.0 - 36.5 g/dL    RDW 12.9 11.5 - 14.5 %    PLATELET 24 (LL) 329 - 400 K/uL    MPV 11.7 8.9 - 12.9 FL    NRBC 0.0 0  WBC    ABSOLUTE NRBC 0.00 0.00 - 0.01 K/uL    NEUTROPHILS 68 32 - 75 %    LYMPHOCYTES 18 12 - 49 %    MONOCYTES 14 (H) 5 - 13 %    EOSINOPHILS 0 0 - 7 %    BASOPHILS 0 0 - 1 %    IMMATURE GRANULOCYTES 0 %    ABS.  NEUTROPHILS 3.6 1.8 - 8.0 K/UL    ABS. LYMPHOCYTES 1.0 0.8 - 3.5 K/UL    ABS. MONOCYTES 0.7 0.0 - 1.0 K/UL    ABS. EOSINOPHILS 0.0 0.0 - 0.4 K/UL    ABS. BASOPHILS 0.0 0.0 - 0.1 K/UL    ABS. IMM. GRANS. 0.0 K/UL    DF MANUAL      RBC COMMENTS NORMOCYTIC, NORMOCHROMIC     METABOLIC PANEL, BASIC    Collection Time: 02/16/20  4:35 AM   Result Value Ref Range    Sodium 140 136 - 145 mmol/L    Potassium 3.9 3.5 - 5.1 mmol/L    Chloride 111 (H) 97 - 108 mmol/L    CO2 24 21 - 32 mmol/L    Anion gap 5 5 - 15 mmol/L    Glucose 102 (H) 65 - 100 mg/dL    BUN 12 6 - 20 MG/DL    Creatinine 1.06 0.70 - 1.30 MG/DL    BUN/Creatinine ratio 11 (L) 12 - 20      GFR est AA >60 >60 ml/min/1.73m2    GFR est non-AA >60 >60 ml/min/1.73m2    Calcium 7.7 (L) 8.5 - 10.1 MG/DL   RETICULOCYTE COUNT    Collection Time: 02/16/20  4:35 AM   Result Value Ref Range    Reticulocyte count 0.5 (L) 0.7 - 2.1 %    Absolute Retic Cnt. 0.0129 (L) 0.0260 - 0.0950 M/ul   HEPATITIS C AB    Collection Time: 02/16/20  4:35 AM   Result Value Ref Range    Hep C  virus Ab Interp. NONREACTIVE NR      Hep C  virus Ab comment Method used is Siemens Advia Centaur     SAMPLES BEING HELD    Collection Time: 02/16/20  4:35 AM   Result Value Ref Range    SAMPLES BEING HELD [id number]     COMMENT        Add-on orders for these samples will be processed based on acceptable specimen integrity and analyte stability, which may vary by analyte.    CBC WITH AUTOMATED DIFF    Collection Time: 02/16/20  9:21 AM   Result Value Ref Range    WBC 4.8 4.1 - 11.1 K/uL    RBC 2.44 (L) 4.10 - 5.70 M/uL    HGB 7.4 (L) 12.1 - 17.0 g/dL    HCT 21.4 (L) 36.6 - 50.3 %    MCV 87.7 80.0 - 99.0 FL    MCH 30.3 26.0 - 34.0 PG    MCHC 34.6 30.0 - 36.5 g/dL    RDW 13.0 11.5 - 14.5 %    PLATELET 20 (LL) 788 - 400 K/uL    MPV 11.2 8.9 - 12.9 FL    NRBC 0.0 0  WBC    ABSOLUTE NRBC 0.00 0.00 - 0.01 K/uL    NEUTROPHILS 74 32 - 75 %    LYMPHOCYTES 16 12 - 49 %    MONOCYTES 10 5 - 13 %    EOSINOPHILS 0 0 - 7 % BASOPHILS 0 0 - 1 %    IMMATURE GRANULOCYTES 0 %    ABS. NEUTROPHILS 3.5 1.8 - 8.0 K/UL    ABS. LYMPHOCYTES 0.8 0.8 - 3.5 K/UL    ABS. MONOCYTES 0.5 0.0 - 1.0 K/UL    ABS. EOSINOPHILS 0.0 0.0 - 0.4 K/UL    ABS. BASOPHILS 0.0 0.0 - 0.1 K/UL    ABS. IMM. GRANS. 0.0 K/UL    DF MANUAL      RBC COMMENTS NORMOCYTIC, NORMOCHROMIC       Recent Labs     02/16/20  0921 02/16/20  0435   WBC 4.8 5.3   HGB 7.4* 7.5*   HCT 21.4* 21.2*   PLT 20* 24*     Recent Labs     02/16/20  0435 02/15/20  0843    139   K 3.9 3.8   * 109*   CO2 24 24   BUN 12 16   CREA 1.06 1.19   * 153*   CA 7.7* 8.5   MG  --  2.1     Recent Labs     02/15/20  1746 02/15/20  0843   SGOT  --  19   ALT  --  37   AP  --  89   TBILI 1.0 1.2*   TP  --  7.3   ALB  --  3.7   GLOB  --  3.6     Recent Labs     02/15/20  0843   INR 1.3*   PTP 13.2*      Recent Labs     02/15/20  0853   TIBC 199*   PSAT 62*   FERR 597*      Lab Results   Component Value Date/Time    Folate 6.3 02/15/2020 08:43 AM      No results for input(s): PH, PCO2, PO2 in the last 72 hours.   Recent Labs     02/15/20  0843   TROIQ <0.05     No results found for: CHOL, CHOLX, CHLST, CHOLV, HDL, HDLP, LDL, LDLC, DLDLP, TGLX, TRIGL, TRIGP, CHHD, CHHDX  No results found for: GLUCPOC  No results found for: COLOR, APPRN, SPGRU, REFSG, JEET, PROTU, GLUCU, KETU, BILU, UROU, KATTY, LEUKU, GLUKE, EPSU, BACTU, WBCU, RBCU, CASTS, UCRY    Med list reviewed  Current Facility-Administered Medications   Medication Dose Route Frequency    0.9% sodium chloride infusion 250 mL  250 mL IntraVENous PRN    sodium chloride (NS) flush 5-40 mL  5-40 mL IntraVENous Q8H    sodium chloride (NS) flush 5-40 mL  5-40 mL IntraVENous PRN    0.9% sodium chloride infusion  75 mL/hr IntraVENous CONTINUOUS    acetaminophen (TYLENOL) tablet 650 mg  650 mg Oral Q4H PRN    ondansetron (ZOFRAN) injection 4 mg  4 mg IntraVENous Q4H PRN    pantoprazole (PROTONIX) 40 mg in 0.9% sodium chloride 10 mL injection  40 mg IntraVENous Q12H    nebivolol (BYSTOLIC) tablet 5 mg  5 mg Oral DAILY    traMADol (ULTRAM) tablet 50 mg  50 mg Oral Q6H PRN    dorzolamide (TRUSOPT) 2 % ophthalmic solution 1 Drop  1 Drop Both Eyes TID    And    timolol (TIMOPTIC) 0.5 % ophthalmic solution 1 Drop  1 Drop Both Eyes BID    0.9% sodium chloride infusion 250 mL  250 mL IntraVENous PRN    0.9% sodium chloride infusion 250 mL  250 mL IntraVENous PRN       Care Plan discussed with:  Patient/Family and Nurse    Addis Reza MD  Internal Medicine  Date of Service: 2/16/2020

## 2020-02-16 NOTE — PROGRESS NOTES
Patient resting quietly today, family and friends in and out throughout the day. No complaints of pain or shortness of breath. Anxious about not being able to go back to work this week. Switched out his bed today as the one he was on continuously made noise making it difficult to sleep. Requested melatonin for tonight to help him sleep.

## 2020-02-16 NOTE — PROGRESS NOTES
GI PROGRESS NOTE    NAME:             Eddie Martínez   :              1960   MRN:              [id number]   Admit Date:     2/15/2020  Todays Date:  2020      Subjective:          No abdominal pain no nausea or vomiting. One small brown stool.  Heme w/u in progress    Review of Systems - Respiratory ROS: no cough, shortness of breath, or wheezing  Cardiovascular ROS: no chest pain or dyspnea on exertion  Medications-reviewed     Current Facility-Administered Medications   Medication Dose Route Frequency    melatonin tablet 3 mg  3 mg Oral QHS PRN    0.9% sodium chloride infusion 250 mL  250 mL IntraVENous PRN    sodium chloride (NS) flush 5-40 mL  5-40 mL IntraVENous Q8H    sodium chloride (NS) flush 5-40 mL  5-40 mL IntraVENous PRN    0.9% sodium chloride infusion  75 mL/hr IntraVENous CONTINUOUS    acetaminophen (TYLENOL) tablet 650 mg  650 mg Oral Q4H PRN    ondansetron (ZOFRAN) injection 4 mg  4 mg IntraVENous Q4H PRN    pantoprazole (PROTONIX) 40 mg in 0.9% sodium chloride 10 mL injection  40 mg IntraVENous Q12H    nebivolol (BYSTOLIC) tablet 5 mg  5 mg Oral DAILY    traMADol (ULTRAM) tablet 50 mg  50 mg Oral Q6H PRN    dorzolamide (TRUSOPT) 2 % ophthalmic solution 1 Drop  1 Drop Both Eyes TID    And    timolol (TIMOPTIC) 0.5 % ophthalmic solution 1 Drop  1 Drop Both Eyes BID    0.9% sodium chloride infusion 250 mL  250 mL IntraVENous PRN    0.9% sodium chloride infusion 250 mL  250 mL IntraVENous PRN        Objective:     Patient Vitals for the past 8 hrs:   BP Temp Pulse Resp SpO2   20 1718 113/65 98.9 °F (37.2 °C) 93 18 97 %   20 1159 113/69 98.7 °F (37.1 °C) 85 16 95 %      07 -  1900  In: 700 [P.O.:700]  Out: -   1901 -  0700  In: 842.1 [I.V.:90]  Out: -     EXAM:    Visit Vitals  /65 (BP 1 Location: Right arm, BP Patient Position: At rest)   Pulse 93   Temp 98.9 °F (37.2 °C)   Resp 18   Ht 6' 3\" (1.905 m)   Wt 107 kg (235 lb 14.3 oz)   SpO2 97%   BMI 29.48 kg/m²     GENERAL:  Pale-appearing, middle-aged gentleman, in no acute distress. Oriented x3. SKIN:  Without petechiae or ecchymoses. HEAD, EYES, EARS, NOSE, AND THROAT:  Sclerae are anicteric. Conjunctivae are pale. Moist mucous membranes. NECK:  Supple with no adenopathy in the neck or groin. CHEST:  Clear to auscultation to percussion. HEART:  Reveals a regular rate and rhythm. ABDOMEN:  Soft with active bowel sounds. Nontender. No definite hepatosplenomegaly. Abdomen is obese. EXTREMITIES:  Without cyanosis, clubbing, or edema. Moving all of his extremities. Data Review     Recent Labs     02/16/20  1655 02/16/20  0921   WBC 5.0 4.8   HGB 7.7* 7.4*   HCT 22.2* 21.4*   PLT 20* 20*     Recent Labs     02/16/20  0435 02/15/20  0843    139   K 3.9 3.8   * 109*   CO2 24 24   BUN 12 16   CREA 1.06 1.19   * 153*   CA 7.7* 8.5     Recent Labs     02/15/20  0843   SGOT 19   AP 89   TP 7.3   ALB 3.7   GLOB 3.6                              Assessment:   1. Severe normochromic anemia  2. Thrombocytopenia and splenomegaly  3. Reported but unconfirmed melena stools heme(-)         Active Problems:    Anemia (2/15/2020)        Plan:   Recommend IV hydration, transfusion, Hematologic w/u in progress. No endoscopy at this point unless he develops overt GI bleed  He well could have gastritis associated with his NSAIDs and significant blood loss associated with his thrombocytopenia but will waith to better define anemia.          Kana Schmidt MD

## 2020-02-16 NOTE — PROGRESS NOTES
Problem: Anemia Care Plan (Adult and Pediatric)  Goal: *Labs within defined limits  Outcome: Progressing Towards Goal     Problem: Hypertension  Goal: *Blood pressure within specified parameters  Outcome: Progressing Towards Goal  Goal: *Labs within defined limits  Outcome: Progressing Towards Goal     Problem: General Infection Care Plan (Adult and Pediatric)  Goal: Improvement in signs and symptoms of infection  Outcome: Progressing Towards Goal

## 2020-02-16 NOTE — PROGRESS NOTES
[facility] at [facility]  [address], [address] [place], [address] [facility]: [phone]  F: [phone]    Reason for Visit:   Adrian Summers is a 62 y.o. male who is seen in consultation at the request of Dr. Taylor Gallagher for evaluation of anemia and thrombocytopenia. Treatment History:   · none    History of Present Illness: The patient is a very pleasant 49-year-old gentleman who presents with a least a 1 week history of increasing fatigue he did start some iron pills about 6 months ago which makes him wonder if things really did not start much before the present time but over the last week he is gotten much more symptomatic and that when he got back from [place] on Friday night he just knew he had to go to the emergency room and get some help at which time he was found to be significantly anemic with thrombocytopenia and for that reason was admitted. The patient has not had any bleeding or bruising. Patient has not had any hematemesis melena hematochezia hemoptysis or hematuria. He has had some dark stools and they were thought to be dark because of his iron supplementation. He has had some constipation and has had to use a suppository. The patient in general has done very well he considers himself to be very healthy he continues to work full-time he recently changed jobs and his insurance changed as well he is last physical exam was about a year ago time. The patient has lost a little bit of weight he has been trying to get lost about 4 pounds and then in the last week he really ate very little so he lost more over the last week. He denied any other symptomatology denies any other GI  musculoskeletal respiratory or cardiac issues. 2/16/2020  The patient's reticulocyte count is low we probably have a decreased proliferative state the bone marrow will tell us a lot about what is going on.   He also has splenomegaly and so we will do a fiber sure for Luigi Dominguez to see whether there is any evidence for scar tissue in his liver resulting in the splenomegaly. He may very well have myeloproliferative disorder which is causing these problems. The bone marrow will give us the most information. History reviewed. No pertinent past medical history. History reviewed. No pertinent surgical history. Social History     Tobacco Use    Smoking status: Former Smoker    Smokeless tobacco: Never Used   Substance Use Topics    Alcohol use: Yes     Frequency: Never     Comment: Rare      History reviewed. No pertinent family history. Current Facility-Administered Medications   Medication Dose Route Frequency    0.9% sodium chloride infusion 250 mL  250 mL IntraVENous PRN    sodium chloride (NS) flush 5-40 mL  5-40 mL IntraVENous Q8H    sodium chloride (NS) flush 5-40 mL  5-40 mL IntraVENous PRN    0.9% sodium chloride infusion  75 mL/hr IntraVENous CONTINUOUS    acetaminophen (TYLENOL) tablet 650 mg  650 mg Oral Q4H PRN    ondansetron (ZOFRAN) injection 4 mg  4 mg IntraVENous Q4H PRN    pantoprazole (PROTONIX) 40 mg in 0.9% sodium chloride 10 mL injection  40 mg IntraVENous Q12H    nebivolol (BYSTOLIC) tablet 5 mg  5 mg Oral DAILY    traMADol (ULTRAM) tablet 50 mg  50 mg Oral Q6H PRN    dorzolamide (TRUSOPT) 2 % ophthalmic solution 1 Drop  1 Drop Both Eyes TID    And    timolol (TIMOPTIC) 0.5 % ophthalmic solution 1 Drop  1 Drop Both Eyes BID    0.9% sodium chloride infusion 250 mL  250 mL IntraVENous PRN    0.9% sodium chloride infusion 250 mL  250 mL IntraVENous PRN      Allergies   Allergen Reactions    Codeine Nausea and Vomiting        Review of Systems: A complete review of systems was obtained, negative except as described above.     Physical Exam:     Visit Vitals  /69 (BP 1 Location: Right arm, BP Patient Position: At rest)   Pulse 85   Temp 98.7 °F (37.1 °C)   Resp 16   Ht 6' 3\" (1.905 m)   Wt 235 lb 14.3 oz (107 kg)   SpO2 95%   BMI 29.48 kg/m²     ECOG PS: 0  General: No distress  Eyes: PERRLA, anicteric sclerae  HENT: Atraumatic, OP clear  Neck: Supple  Lymphatic: No cervical, supraclavicular, or inguinal adenopathy  Respiratory: CTAB, normal respiratory effort  CV: Normal rate, regular rhythm, no murmurs, no peripheral edema  GI: Soft, nontender, nondistended, no masses, no hepatomegaly, no splenomegaly  MS: Digits without clubbing or cyanosis. Skin: No rashes, ecchymoses, or petechiae. Normal temperature, turgor, and texture. Psych: Alert, oriented, appropriate affect, normal judgment/insight    Results:     Lab Results   Component Value Date/Time    WBC 4.8 02/16/2020 09:21 AM    HGB 7.4 (L) 02/16/2020 09:21 AM    HCT 21.4 (L) 02/16/2020 09:21 AM    PLATELET 20 (LL) 17/72/0367 09:21 AM    MCV 87.7 02/16/2020 09:21 AM    ABS. NEUTROPHILS 3.5 02/16/2020 09:21 AM     Lab Results   Component Value Date/Time    Sodium 140 02/16/2020 04:35 AM    Potassium 3.9 02/16/2020 04:35 AM    Chloride 111 (H) 02/16/2020 04:35 AM    CO2 24 02/16/2020 04:35 AM    Glucose 102 (H) 02/16/2020 04:35 AM    BUN 12 02/16/2020 04:35 AM    Creatinine 1.06 02/16/2020 04:35 AM    GFR est AA >60 02/16/2020 04:35 AM    GFR est non-AA >60 02/16/2020 04:35 AM    Calcium 7.7 (L) 02/16/2020 04:35 AM     Lab Results   Component Value Date/Time    Bilirubin, total 1.0 02/15/2020 05:46 PM    ALT (SGPT) 37 02/15/2020 08:43 AM    AST (SGOT) 19 02/15/2020 08:43 AM    Alk. phosphatase 89 02/15/2020 08:43 AM    Protein, total 7.3 02/15/2020 08:43 AM    Albumin 3.7 02/15/2020 08:43 AM    Globulin 3.6 02/15/2020 08:43 AM         Records reviewed and summarized above. Pathology report(s) reviewed above. Radiology report(s) reviewed above. Assessment:   1) anemia and thrombocytopenia normocytic of undetermined etiology with a low retic count. 2) splenomegaly     Plan:     · We will go ahead with some blood work today.   That will hopefully give us some answers we will get him set up for CT biopsy of his bone to get a tissue diagnosis. · We will plan on transfusing if hemoglobin less than 7.  · We will transfuse platelets if platelet count less than 10,000. I appreciate the opportunity to participate in Mr. Jan montes.     Signed By: Mary Elizabeth MD

## 2020-02-17 ENCOUNTER — APPOINTMENT (OUTPATIENT)
Dept: CT IMAGING | Age: 60
DRG: 836 | End: 2020-02-17
Attending: INTERNAL MEDICINE
Payer: COMMERCIAL

## 2020-02-17 LAB
ANA SER QL: NEGATIVE
ARSENIC BLD-MCNC: 8 UG/L (ref 2–23)
BASOPHILS # BLD: 0 K/UL (ref 0–0.1)
BASOPHILS NFR BLD: 0 % (ref 0–1)
DIFFERENTIAL METHOD BLD: ABNORMAL
EOSINOPHIL # BLD: 0 K/UL (ref 0–0.4)
EOSINOPHIL NFR BLD: 0 % (ref 0–7)
EOSINOPHIL NFR BLD: 0 % (ref 0–7)
EOSINOPHIL NFR BLD: 1 % (ref 0–7)
EPO SERPL-ACNC: 12.1 MIU/ML (ref 2.6–18.5)
ERYTHROCYTE [DISTWIDTH] IN BLOOD BY AUTOMATED COUNT: 13 % (ref 11.5–14.5)
ERYTHROCYTE [DISTWIDTH] IN BLOOD BY AUTOMATED COUNT: 13.1 % (ref 11.5–14.5)
ERYTHROCYTE [DISTWIDTH] IN BLOOD BY AUTOMATED COUNT: 13.1 % (ref 11.5–14.5)
HBV CORE AB SERPL QL IA: NEGATIVE
HCT VFR BLD AUTO: 19.5 % (ref 36.6–50.3)
HCT VFR BLD AUTO: 22.2 % (ref 36.6–50.3)
HCT VFR BLD AUTO: 23.6 % (ref 36.6–50.3)
HGB BLD-MCNC: 6.7 G/DL (ref 12.1–17)
HGB BLD-MCNC: 7.8 G/DL (ref 12.1–17)
HGB BLD-MCNC: 8.1 G/DL (ref 12.1–17)
HISPANIC, LDP2T: NO
IMM GRANULOCYTES # BLD AUTO: 0 K/UL
IMM GRANULOCYTES NFR BLD AUTO: 0 %
KAPPA LC FREE SER-MCNC: 26.2 MG/L (ref 3.3–19.4)
KAPPA LC FREE/LAMBDA FREE SER: 1.18 {RATIO} (ref 0.26–1.65)
LAMBDA LC FREE SERPL-MCNC: 22.2 MG/L (ref 5.7–26.3)
LEAD BLD-MCNC: NORMAL UG/DL (ref 0–4)
LYMPHOCYTES # BLD: 0.7 K/UL (ref 0.8–3.5)
LYMPHOCYTES # BLD: 0.8 K/UL (ref 0.8–3.5)
LYMPHOCYTES # BLD: 1.1 K/UL (ref 0.8–3.5)
LYMPHOCYTES NFR BLD: 17 % (ref 12–49)
LYMPHOCYTES NFR BLD: 17 % (ref 12–49)
LYMPHOCYTES NFR BLD: 24 % (ref 12–49)
MCH RBC QN AUTO: 30 PG (ref 26–34)
MCH RBC QN AUTO: 30.1 PG (ref 26–34)
MCH RBC QN AUTO: 30.5 PG (ref 26–34)
MCHC RBC AUTO-ENTMCNC: 34.3 G/DL (ref 30–36.5)
MCHC RBC AUTO-ENTMCNC: 34.4 G/DL (ref 30–36.5)
MCHC RBC AUTO-ENTMCNC: 35.1 G/DL (ref 30–36.5)
MCV RBC AUTO: 86.7 FL (ref 80–99)
MCV RBC AUTO: 87.4 FL (ref 80–99)
MCV RBC AUTO: 87.7 FL (ref 80–99)
MERCURY BLD-MCNC: NORMAL UG/L (ref 0–14.9)
MONOCYTES # BLD: 0.2 K/UL (ref 0–1)
MONOCYTES # BLD: 0.3 K/UL (ref 0–1)
MONOCYTES # BLD: 0.5 K/UL (ref 0–1)
MONOCYTES NFR BLD: 10 % (ref 5–13)
MONOCYTES NFR BLD: 5 % (ref 5–13)
MONOCYTES NFR BLD: 7 % (ref 5–13)
NEUTS SEG # BLD: 3.2 K/UL (ref 1.8–8)
NEUTS SEG # BLD: 3.3 K/UL (ref 1.8–8)
NEUTS SEG # BLD: 3.5 K/UL (ref 1.8–8)
NEUTS SEG NFR BLD: 71 % (ref 32–75)
NEUTS SEG NFR BLD: 73 % (ref 32–75)
NEUTS SEG NFR BLD: 75 % (ref 32–75)
NRBC # BLD: 0 K/UL (ref 0–0.01)
NRBC BLD-RTO: 0 PER 100 WBC
PLATELET # BLD AUTO: 15 K/UL (ref 150–400)
PLATELET # BLD AUTO: 16 K/UL (ref 150–400)
PLATELET # BLD AUTO: 18 K/UL (ref 150–400)
PMV BLD AUTO: 10.6 FL (ref 8.9–12.9)
PMV BLD AUTO: 11.5 FL (ref 8.9–12.9)
RACE, 017371: NORMAL
RBC # BLD AUTO: 2.23 M/UL (ref 4.1–5.7)
RBC # BLD AUTO: 2.56 M/UL (ref 4.1–5.7)
RBC # BLD AUTO: 2.69 M/UL (ref 4.1–5.7)
RBC MORPH BLD: ABNORMAL
SPECIMEN SOURCE: NORMAL
TEST PURPOSE, LDP4T: NORMAL
WBC # BLD AUTO: 4.3 K/UL (ref 4.1–11.1)
WBC # BLD AUTO: 4.5 K/UL (ref 4.1–11.1)
WBC # BLD AUTO: 4.8 K/UL (ref 4.1–11.1)

## 2020-02-17 PROCEDURE — 77030028872 HC BN BIOP NDL ON CNTRL TY TELE -C

## 2020-02-17 PROCEDURE — 65660000000 HC RM CCU STEPDOWN

## 2020-02-17 PROCEDURE — 88185 FLOWCYTOMETRY/TC ADD-ON: CPT

## 2020-02-17 PROCEDURE — 97530 THERAPEUTIC ACTIVITIES: CPT

## 2020-02-17 PROCEDURE — 74011250636 HC RX REV CODE- 250/636: Performed by: INTERNAL MEDICINE

## 2020-02-17 PROCEDURE — 74011250636 HC RX REV CODE- 250/636: Performed by: RADIOLOGY

## 2020-02-17 PROCEDURE — 81450 HL NEO GSAP 5-50DNA/DNA&RNA: CPT

## 2020-02-17 PROCEDURE — 88237 TISSUE CULTURE BONE MARROW: CPT

## 2020-02-17 PROCEDURE — 74011250637 HC RX REV CODE- 250/637: Performed by: INTERNAL MEDICINE

## 2020-02-17 PROCEDURE — 97161 PT EVAL LOW COMPLEX 20 MIN: CPT

## 2020-02-17 PROCEDURE — 36415 COLL VENOUS BLD VENIPUNCTURE: CPT

## 2020-02-17 PROCEDURE — 88342 IMHCHEM/IMCYTCHM 1ST ANTB: CPT

## 2020-02-17 PROCEDURE — 74011000250 HC RX REV CODE- 250: Performed by: INTERNAL MEDICINE

## 2020-02-17 PROCEDURE — 07DR3ZX EXTRACTION OF ILIAC BONE MARROW, PERCUTANEOUS APPROACH, DIAGNOSTIC: ICD-10-PCS | Performed by: RADIOLOGY

## 2020-02-17 PROCEDURE — 88374 M/PHMTRC ALYS ISHQUANT/SEMIQ: CPT

## 2020-02-17 PROCEDURE — C9113 INJ PANTOPRAZOLE SODIUM, VIA: HCPCS | Performed by: INTERNAL MEDICINE

## 2020-02-17 PROCEDURE — 77030014115

## 2020-02-17 PROCEDURE — 88184 FLOWCYTOMETRY/ TC 1 MARKER: CPT

## 2020-02-17 PROCEDURE — 97116 GAIT TRAINING THERAPY: CPT

## 2020-02-17 PROCEDURE — 88305 TISSUE EXAM BY PATHOLOGIST: CPT

## 2020-02-17 PROCEDURE — 38222 DX BONE MARROW BX & ASPIR: CPT

## 2020-02-17 PROCEDURE — 88341 IMHCHEM/IMCYTCHM EA ADD ANTB: CPT

## 2020-02-17 PROCEDURE — 77030003666 HC NDL SPINAL BD -A

## 2020-02-17 PROCEDURE — P9016 RBC LEUKOCYTES REDUCED: HCPCS

## 2020-02-17 PROCEDURE — 85025 COMPLETE CBC W/AUTO DIFF WBC: CPT

## 2020-02-17 PROCEDURE — 99152 MOD SED SAME PHYS/QHP 5/>YRS: CPT

## 2020-02-17 PROCEDURE — 99153 MOD SED SAME PHYS/QHP EA: CPT

## 2020-02-17 PROCEDURE — 88264 CHROMOSOME ANALYSIS 20-25: CPT

## 2020-02-17 PROCEDURE — 36430 TRANSFUSION BLD/BLD COMPNT: CPT

## 2020-02-17 RX ORDER — FENTANYL CITRATE 50 UG/ML
200 INJECTION, SOLUTION INTRAMUSCULAR; INTRAVENOUS
Status: DISCONTINUED | OUTPATIENT
Start: 2020-02-17 | End: 2020-02-17

## 2020-02-17 RX ORDER — SODIUM CHLORIDE 9 MG/ML
250 INJECTION, SOLUTION INTRAVENOUS AS NEEDED
Status: DISCONTINUED | OUTPATIENT
Start: 2020-02-17 | End: 2020-02-18 | Stop reason: HOSPADM

## 2020-02-17 RX ORDER — MIDAZOLAM HYDROCHLORIDE 1 MG/ML
5 INJECTION, SOLUTION INTRAMUSCULAR; INTRAVENOUS
Status: DISCONTINUED | OUTPATIENT
Start: 2020-02-17 | End: 2020-02-17

## 2020-02-17 RX ORDER — SODIUM CHLORIDE 9 MG/ML
500 INJECTION, SOLUTION INTRAVENOUS CONTINUOUS
Status: DISCONTINUED | OUTPATIENT
Start: 2020-02-17 | End: 2020-02-17

## 2020-02-17 RX ORDER — SODIUM CHLORIDE 0.9 % (FLUSH) 0.9 %
10 SYRINGE (ML) INJECTION AS NEEDED
Status: DISCONTINUED | OUTPATIENT
Start: 2020-02-17 | End: 2020-02-17

## 2020-02-17 RX ADMIN — FENTANYL CITRATE 50 MCG: 50 INJECTION INTRAMUSCULAR; INTRAVENOUS at 09:08

## 2020-02-17 RX ADMIN — SODIUM CHLORIDE 500 ML: 900 INJECTION, SOLUTION INTRAVENOUS at 08:41

## 2020-02-17 RX ADMIN — SODIUM CHLORIDE 75 ML/HR: 900 INJECTION, SOLUTION INTRAVENOUS at 21:06

## 2020-02-17 RX ADMIN — TIMOLOL MALEATE 1 DROP: 5 SOLUTION/ DROPS OPHTHALMIC at 17:04

## 2020-02-17 RX ADMIN — NEBIVOLOL HYDROCHLORIDE 5 MG: 2.5 TABLET ORAL at 11:21

## 2020-02-17 RX ADMIN — FENTANYL CITRATE 50 MCG: 50 INJECTION INTRAMUSCULAR; INTRAVENOUS at 08:56

## 2020-02-17 RX ADMIN — SODIUM CHLORIDE 40 MG: 9 INJECTION INTRAMUSCULAR; INTRAVENOUS; SUBCUTANEOUS at 20:58

## 2020-02-17 RX ADMIN — DORZOLAMIDE HYDROCHLORIDE 1 DROP: 20 SOLUTION/ DROPS OPHTHALMIC at 21:04

## 2020-02-17 RX ADMIN — TIMOLOL MALEATE 1 DROP: 5 SOLUTION/ DROPS OPHTHALMIC at 11:22

## 2020-02-17 RX ADMIN — DORZOLAMIDE HYDROCHLORIDE 1 DROP: 20 SOLUTION/ DROPS OPHTHALMIC at 17:04

## 2020-02-17 RX ADMIN — DORZOLAMIDE HYDROCHLORIDE 1 DROP: 20 SOLUTION/ DROPS OPHTHALMIC at 11:22

## 2020-02-17 RX ADMIN — SODIUM CHLORIDE 75 ML/HR: 900 INJECTION, SOLUTION INTRAVENOUS at 14:04

## 2020-02-17 RX ADMIN — ACETAMINOPHEN 650 MG: 325 TABLET ORAL at 16:58

## 2020-02-17 RX ADMIN — Medication 10 ML: at 20:59

## 2020-02-17 RX ADMIN — SODIUM CHLORIDE 40 MG: 9 INJECTION INTRAMUSCULAR; INTRAVENOUS; SUBCUTANEOUS at 11:22

## 2020-02-17 RX ADMIN — MIDAZOLAM 1 MG: 1 INJECTION INTRAMUSCULAR; INTRAVENOUS at 09:09

## 2020-02-17 RX ADMIN — MIDAZOLAM 1 MG: 1 INJECTION INTRAMUSCULAR; INTRAVENOUS at 08:39

## 2020-02-17 RX ADMIN — Medication 10 ML: at 17:05

## 2020-02-17 NOTE — PROGRESS NOTES
Patient arrived from angio, bandaide intact, no sign of bleeding or hematoma. CBC drawn, and sent to lab, no complaints of pain. 1118- Patient resting in bed bandaid intact, no sign of bleeding or hematoma. 1205- patient eating in bed, no sign of bleeding or hematoma. 1307- patient resting in bed, bandaid intact, no sign of bleeding or hematoma, no complaints of pain.

## 2020-02-17 NOTE — PROGRESS NOTES
TRANSITIONS OF CARE PLAN:   1. DESTINATION: Likely Own Home  2. TRANSPORT: Spouse  3. ADDITIONAL SUPPORT: Spouse  4. DME: Access To Most, but doesn't actively use any  5. HOME HEALTH: TBD   6. CODE STATUS/AMD STATUS: Full Code; not on file  7. FOLLOW UP APPOINTMENTS: PCP, Hem-Onc, GI  8. STILL NEEDS: Psych Consult, Hem-Onc Input, Bone Marrow Biopsy Results, Trending Hemoglobin    Reason for Admission:   Anemia                   RUR Score:       9%              Plan for utilizing home health:      TBD                    Current Advanced Directive/Advance Care Plan: Full Code; not on file                         Transition of Care Plan:   Patient has no hx of HH, IPR, or SNF. [facility]. Attending presented during assessment, as patient had just returned from bone marrow biopsy. Patient's hemoglobin and platelet count have dropped again. Patient has received a total of 3 units of PRBC so far. During assessment process, spouse asked about FMLA, as patient recently became employed with a new company. CM identified that patient should contact the company's HR department for clarification on their FMLA policies, when patient stated, \"Or I could just take a 45 and put a bullet straight to the brain. That would be the cheapest and fastest solution for everyone involved. \"  Spouse and bedside nursing were present; attending had just left bedside. CM and nursing discussed the above with attending; Potential psych Consult to be placed for potential SI once patient has been seen by attending. Care Management Interventions  PCP Verified by CM: Yes(last seen by Dr. Giovanna Rashid in September)  Palliative Care Criteria Met (RRAT>21 & CHF Dx)?: No  Mode of Transport at Discharge:  Other (see comment)(spouse to transport)  Transition of Care Consult (CM Consult): Discharge Planning  MyChart Signup: No  Discharge Durable Medical Equipment: No(no dme)  Health Maintenance Reviewed: Yes(cm met with patient, with spouse and nursing present, and with patient alert and oriented x 4)  Physical Therapy Consult: No  Occupational Therapy Consult: No  Speech Therapy Consult: No  Current Support Network: Own Home, Lives with Spouse  Confirm Follow Up Transport: Self(independent in adls, to include driving)  The Plan for Transition of Care is Related to the Following Treatment Goals : home with family assistance  Discharge Location  Discharge Placement: Home with family assistance(lives with spouse in a 2 story home, 3 exterior steps, 2nd floor bedroom, 14 interior steps)    CRM: Yuri English, MPH, [address] [place] [address]; Z: [phone]

## 2020-02-17 NOTE — PROGRESS NOTES
[facility] [place] at [address], [address], [address] [place]: [phone]  F: [phone]    Reason for Visit:   Eddie Martínez is a 62 y.o. male who is seen in consultation at the request of Dr. Alec Conner for evaluation of anemia and thrombocytopenia. Treatment History:   · Bone marrow aspiration and biopsy 2/17/2020    History of Present Illness: The patient is a very pleasant 25-year-old gentleman who presents with a least a 1 week history of increasing fatigue he did start some iron pills about 6 months ago which makes him wonder if things really did not start much before the present time but over the last week he is gotten much more symptomatic and that when he got back from [facility] on Friday night he just knew he had to go to the emergency room and get some help at which time he was found to be significantly anemic with thrombocytopenia and for that reason was admitted. The patient has not had any bleeding or bruising. Patient has not had any hematemesis melena hematochezia hemoptysis or hematuria. He has had some dark stools and they were thought to be dark because of his iron supplementation. He has had some constipation and has had to use a suppository. The patient in general has done very well he considers himself to be very healthy he continues to work full-time he recently changed jobs and his insurance changed as well he is last physical exam was about a year ago time. The patient has lost a little bit of weight he has been trying to get lost about 4 pounds and then in the last week he really ate very little so he lost more over the last week. He denied any other symptomatology denies any other GI  musculoskeletal respiratory or cardiac issues. 2/16/2020  The patient's reticulocyte count is low we probably have a decreased proliferative state the bone marrow will tell us a lot about what is going on.   He also has splenomegaly and so we will do a fiber sure for Vasyl Forbes to see whether there is any evidence for scar tissue in his liver resulting in the splenomegaly. He may very well have myeloproliferative disorder which is causing these problems. The bone marrow will give us the most information. 2/17/2020  Patient had his bone marrow aspiration and biopsy done today. Still waiting on blood work. The patient's platelet count today is 16,000 will probably need platelets in the next day or 2. History reviewed. No pertinent past medical history. History reviewed. No pertinent surgical history. Social History     Tobacco Use    Smoking status: Former Smoker    Smokeless tobacco: Never Used   Substance Use Topics    Alcohol use: Yes     Frequency: Never     Comment: Rare      History reviewed. No pertinent family history.   Current Facility-Administered Medications   Medication Dose Route Frequency    0.9% sodium chloride infusion 250 mL  250 mL IntraVENous PRN    melatonin tablet 3 mg  3 mg Oral QHS PRN    0.9% sodium chloride infusion 250 mL  250 mL IntraVENous PRN    sodium chloride (NS) flush 5-40 mL  5-40 mL IntraVENous Q8H    sodium chloride (NS) flush 5-40 mL  5-40 mL IntraVENous PRN    0.9% sodium chloride infusion  75 mL/hr IntraVENous CONTINUOUS    acetaminophen (TYLENOL) tablet 650 mg  650 mg Oral Q4H PRN    ondansetron (ZOFRAN) injection 4 mg  4 mg IntraVENous Q4H PRN    pantoprazole (PROTONIX) 40 mg in 0.9% sodium chloride 10 mL injection  40 mg IntraVENous Q12H    nebivolol (BYSTOLIC) tablet 5 mg  5 mg Oral DAILY    traMADol (ULTRAM) tablet 50 mg  50 mg Oral Q6H PRN    dorzolamide (TRUSOPT) 2 % ophthalmic solution 1 Drop  1 Drop Both Eyes TID    And    timolol (TIMOPTIC) 0.5 % ophthalmic solution 1 Drop  1 Drop Both Eyes BID    0.9% sodium chloride infusion 250 mL  250 mL IntraVENous PRN    0.9% sodium chloride infusion 250 mL  250 mL IntraVENous PRN      Allergies   Allergen Reactions    Codeine Nausea and Vomiting        Review of Systems: A complete review of systems was obtained, negative except as described above. Physical Exam:     Visit Vitals  /75 (BP 1 Location: Right arm, BP Patient Position: At rest;Supine)   Pulse 97   Temp 98.3 °F (36.8 °C)   Resp 18   Ht 6' 3\" (1.905 m)   Wt 237 lb 14 oz (107.9 kg)   SpO2 97%   BMI 29.73 kg/m²     ECOG PS: 0  General: No distress  Eyes: PERRLA, anicteric sclerae  HENT: Atraumatic, OP clear  Neck: Supple  Lymphatic: No cervical, supraclavicular, or inguinal adenopathy  Respiratory: CTAB, normal respiratory effort  CV: Normal rate, regular rhythm, no murmurs, no peripheral edema  GI: Soft, nontender, nondistended, no masses, no hepatomegaly, no splenomegaly  MS: Digits without clubbing or cyanosis. Skin: No rashes, ecchymoses, or petechiae. Normal temperature, turgor, and texture. Psych: Alert, oriented, appropriate affect, normal judgment/insight    Results:     Lab Results   Component Value Date/Time    WBC 4.8 02/17/2020 10:32 AM    HGB 8.1 (L) 02/17/2020 10:32 AM    HCT 23.6 (L) 02/17/2020 10:32 AM    PLATELET 16 (LL) 61/69/4304 10:32 AM    MCV 87.7 02/17/2020 10:32 AM    ABS. NEUTROPHILS 3.5 02/17/2020 10:32 AM     Lab Results   Component Value Date/Time    Sodium 140 02/16/2020 04:35 AM    Potassium 3.9 02/16/2020 04:35 AM    Chloride 111 (H) 02/16/2020 04:35 AM    CO2 24 02/16/2020 04:35 AM    Glucose 102 (H) 02/16/2020 04:35 AM    BUN 12 02/16/2020 04:35 AM    Creatinine 1.06 02/16/2020 04:35 AM    GFR est AA >60 02/16/2020 04:35 AM    GFR est non-AA >60 02/16/2020 04:35 AM    Calcium 7.7 (L) 02/16/2020 04:35 AM     Lab Results   Component Value Date/Time    Bilirubin, total 1.0 02/15/2020 05:46 PM    ALT (SGPT) 37 02/15/2020 08:43 AM    AST (SGOT) 19 02/15/2020 08:43 AM    Alk.  phosphatase 89 02/15/2020 08:43 AM    Protein, total 7.3 02/15/2020 08:43 AM    Albumin 3.7 02/15/2020 08:43 AM    Globulin 3.6 02/15/2020 08:43 AM         Records reviewed and summarized above. Pathology report(s) reviewed above. Radiology report(s) reviewed above. Assessment:   1) anemia and thrombocytopenia normocytic of undetermined etiology with a low retic count. Work-up is in progress. Bone marrow aspiration and biopsy was done today. 2) splenomegaly work-up is in progress. Plan:     · We will go ahead with some blood work today. That will hopefully give us some answers we will get him set up for CT biopsy of his bone to get a tissue diagnosis. · We will plan on transfusing if hemoglobin less than 7.  · We will transfuse platelets if platelet count less than 10,000. I appreciate the opportunity to participate in Mr. Melvin montes.     Signed By: Pushpa Perez MD

## 2020-02-17 NOTE — PROCEDURES
Interventional Radiology        2/17/2020    Informed consent obtained    Diagnosis:  Anemia and thrombocytopenia. Procedure(s): CT guided bone marrow biopsy. Specimens removed:  5mL bone marrow aspirate, 2.5 cm bone marrow core biopsy. Complications: None    Primary Physician: Bossman Amador MD    Recomendations: none    Discharge Disposition: Radiology recovery, then floor if no immediate complications.     Full dictated report to follow    Signed By: Bossman Amador MD

## 2020-02-17 NOTE — PROGRESS NOTES
Occupational Therapy  02/17/20    Order acknowledged, chart reviewed, discussed with PT. Patient is IND, at his baseline, no acute OT needs therefore skilled evaluation not indicated. Will sign off at this time, please re-consult if a decrease in functional performance occurs.      Thank you,   Husam Napier, OTD, OTR/L

## 2020-02-17 NOTE — PROGRESS NOTES
Patient ate most of his lunch today, this afternoon is complaining of nausea and temperature is elevated again. He stated that he has been having this nausea on and off for about a week now, when he eats the nausea is worse.

## 2020-02-17 NOTE — PROGRESS NOTES
PHYSICAL THERAPY EVALUATION/DISCHARGE  Patient: Bijal Stoddard (49 y.o. male)  Date: 2/17/2020  Primary Diagnosis: Anemia [D64.9]  Procedure(s) (LRB):  ESOPHAGOGASTRODUODENOSCOPY (EGD) (N/A)     Precautions:          ASSESSMENT  Based on the objective data described below, the patient presents with baseline strength, balance, and endurance following admission for anemia and thrombocytopenia. Gait training x350' without DME with an accelerated wanda and decreased arm swing but no LOB and minimal LANDON. Vitals remained stable throughout. No physical therapy needs identified and patient is independent for ADLs. Will sign off. Functional Outcome Measure: The patient scored 27/28 on the Tinetti outcome measure which is indicative of low fall risk. Further skilled acute physical therapy is not indicated at this time. PLAN :  Recommendation for discharge: (in order for the patient to meet his/her long term goals)  No skilled physical therapy/ follow up rehabilitation needs identified at this time. This discharge recommendation:  Has been made in collaboration with the attending provider and/or case management    IF patient discharges home will need the following DME: none       SUBJECTIVE:   Patient stated Wilfrido Hdz was just getting ready to fall asleep.     OBJECTIVE DATA SUMMARY:   HISTORY:    History reviewed. No pertinent past medical history. History reviewed. No pertinent surgical history.     Prior level of function: independent and working  Personal factors and/or comorbidities impacting plan of care:     Home Situation  Home Environment: Private residence  # Steps to Enter: 3  Rails to Enter: Yes  One/Two Story Residence: Two story  # of Interior Steps: 13  Living Alone: No  Support Systems: Child(oracio), Spouse/Significant Other/Partner, Family member(s), Friends \ neighbors  Patient Expects to be Discharged to[de-identified] Private residence  Current DME Used/Available at Home: None    EXAMINATION/PRESENTATION/DECISION MAKING:   Critical Behavior:  Neurologic State: Alert  Orientation Level: Oriented X4        Hearing: Auditory  Auditory Impairment: None  Skin:    Edema:   Range Of Motion:  AROM: Within functional limits                       Strength:    Strength: Within functional limits                    Tone & Sensation:   Tone: Normal              Sensation: Intact               Coordination:  Coordination: Within functional limits  Vision:      Functional Mobility:  Bed Mobility:  Rolling: Independent  Supine to Sit: Independent  Sit to Supine: Independent     Transfers:  Sit to Stand: Independent  Stand to Sit: Independent                       Balance:   Sitting: Intact  Standing: Intact  Ambulation/Gait Training:  Distance (ft): 350 Feet (ft)  Assistive Device: Gait belt        Gait Description (WDL): Exceptions to WDL  Gait Abnormalities: Altered arm swing              Speed/Clarice: Accelerated                        Stairs:                Functional Measure:  Tinetti test:    Sitting Balance: 1  Arises: 2  Attempts to Rise: 2  Immediate Standing Balance: 2  Standing Balance: 2  Nudged: 2  Eyes Closed: 1  Turn 360 Degrees - Continuous/Discontinuous: 1  Turn 360 Degrees - Steady/Unsteady: 1  Sitting Down: 2  Balance Score: 16 Balance total score  Indication of Gait: 1  R Step Length/Height: 1  L Step Length/Height: 1  R Foot Clearance: 1  L Foot Clearance: 1  Step Symmetry: 1  Step Continuity: 1  Path: 1  Trunk: 2  Walking Time: 1  Gait Score: 11 Gait total score  Total Score: 27/28 Overall total score         Tinetti Tool Score Risk of Falls  <19 = High Fall Risk  19-24 = Moderate Fall Risk  25-28 = Low Fall Risk  Tinetti ME. Performance-Oriented Assessment of Mobility Problems in Elderly Patients. Blank[place] 64; [id number].  (Scoring Description: PT Bulletin Feb. 10, 1993)    Older adults: Babar Ambrose et al, 2009; n = [address] [employer] elderly evaluated with ABC, HILDA, ADL, and IADL)  · Mean HILDA score for males aged 69-68 years = 26.21(3.40)  · Mean HILDA score for females age 69-68 years = 25.16(4.30)  · Mean HILDA score for males over 80 years = 23.29(6.02)  · Mean HILDA score for females over 80 years = 17.20(8.32)            Physical Therapy Evaluation Charge Determination   History Examination Presentation Decision-Making   MEDIUM  Complexity : 1-2 comorbidities / personal factors will impact the outcome/ POC  LOW Complexity : 1-2 Standardized tests and measures addressing body structure, function, activity limitation and / or participation in recreation  LOW Complexity : Stable, uncomplicated  LOW Complexity : FOTO score of       Based on the above components, the patient evaluation is determined to be of the following complexity level: LOW     Pain Rating:      Activity Tolerance: WNL   SpO2 97% on RA, , /75 mmHg  Please refer to the flowsheet for vital signs taken during this treatment. After treatment patient left in no apparent distress:   Supine in bed and Call bell within reach    COMMUNICATION/EDUCATION:   The patients plan of care was discussed with: Registered Nurse. Fall prevention education was provided and the patient/caregiver indicated understanding., Patient/family have participated as able in goal setting and plan of care. and Patient/family agree to work toward stated goals and plan of care.     Thank you for this referral.  Ellyn Patricia, PT, DPT   Time Calculation: 33 mins

## 2020-02-17 NOTE — H&P
Radiology History and Physical    Patient: Priyanka Mcguire 62 y.o. male       Chief Complaint: Shortness of Breath and Rapid Heart Rate      History of Present Illness: Anemia and thrombocytopenia for bone marrow biopsy. History:    History reviewed. No pertinent past medical history. History reviewed. No pertinent family history. Social History     Socioeconomic History    Marital status:      Spouse name: Not on file    Number of children: Not on file    Years of education: Not on file    Highest education level: Not on file   Occupational History    Not on file   Social Needs    Financial resource strain: Not on file    Food insecurity:     Worry: Not on file     Inability: Not on file    Transportation needs:     Medical: Not on file     Non-medical: Not on file   Tobacco Use    Smoking status: Former Smoker    Smokeless tobacco: Never Used   Substance and Sexual Activity    Alcohol use: Yes     Frequency: Never     Comment: Rare    Drug use: Not on file    Sexual activity: Not on file   Lifestyle    Physical activity:     Days per week: Not on file     Minutes per session: Not on file    Stress: Not on file   Relationships    Social connections:     Talks on phone: Not on file     Gets together: Not on file     Attends Zoroastrianism service: Not on file     Active member of club or organization: Not on file     Attends meetings of clubs or organizations: Not on file     Relationship status: Not on file    Intimate partner violence:     Fear of current or ex partner: Not on file     Emotionally abused: Not on file     Physically abused: Not on file     Forced sexual activity: Not on file   Other Topics Concern    Not on file   Social History Narrative    Not on file       Allergies:    Allergies   Allergen Reactions    Codeine Nausea and Vomiting       Current Medications:  Current Facility-Administered Medications   Medication Dose Route Frequency    0.9% sodium chloride infusion 250 mL  250 mL IntraVENous PRN    midazolam (VERSED) injection 5 mg  5 mg IntraVENous Multiple    fentaNYL citrate (PF) injection 200 mcg  200 mcg IntraVENous Multiple    0.9% sodium chloride infusion 500 mL  500 mL IntraVENous CONTINUOUS    saline peripheral flush soln 10 mL  10 mL InterCATHeter PRN    melatonin tablet 3 mg  3 mg Oral QHS PRN    0.9% sodium chloride infusion 250 mL  250 mL IntraVENous PRN    sodium chloride (NS) flush 5-40 mL  5-40 mL IntraVENous Q8H    sodium chloride (NS) flush 5-40 mL  5-40 mL IntraVENous PRN    0.9% sodium chloride infusion  75 mL/hr IntraVENous CONTINUOUS    acetaminophen (TYLENOL) tablet 650 mg  650 mg Oral Q4H PRN    ondansetron (ZOFRAN) injection 4 mg  4 mg IntraVENous Q4H PRN    pantoprazole (PROTONIX) 40 mg in 0.9% sodium chloride 10 mL injection  40 mg IntraVENous Q12H    nebivolol (BYSTOLIC) tablet 5 mg  5 mg Oral DAILY    traMADol (ULTRAM) tablet 50 mg  50 mg Oral Q6H PRN    dorzolamide (TRUSOPT) 2 % ophthalmic solution 1 Drop  1 Drop Both Eyes TID    And    timolol (TIMOPTIC) 0.5 % ophthalmic solution 1 Drop  1 Drop Both Eyes BID    0.9% sodium chloride infusion 250 mL  250 mL IntraVENous PRN    0.9% sodium chloride infusion 250 mL  250 mL IntraVENous PRN        Physical Exam:  Blood pressure (P) 135/60, pulse (P) 95, temperature (P) 98.7 °F (37.1 °C), resp. rate (P) 18, height 6' 3\" (1.905 m), weight 107.9 kg (237 lb 14 oz), SpO2 (P) 95 %.   GENERAL: alert, cooperative, no distress, appears stated age, LUNG: clear to auscultation bilaterally, HEART: regular rate and rhythm      Alerts:    Hospital Problems  Never Reviewed          Codes Class Noted POA    Anemia ICD-10-CM: D64.9  ICD-9-CM: 285.9  2/15/2020 Unknown              Laboratory:      Recent Labs     02/17/20  0047  02/16/20  0435  02/15/20  0843   HGB 6.7*   < > 7.5*   < > 5.7*   HCT 19.5*   < > 21.2*   < > 16.5*   WBC 4.5   < > 5.3   < > 4.9   PLT 18*   < > 24*   < > 21*   INR  --   --   -- --  1.3*   BUN  --   --  12  --  16   CREA  --   --  1.06  --  1.19   K  --   --  3.9  --  3.8    < > = values in this interval not displayed. Plan of Care/Planned Procedure:  Risks, benefits, and alternatives reviewed with patient and he agrees to proceed with the procedure.        Susie Munoz MD

## 2020-02-17 NOTE — PROGRESS NOTES
Linda Pickett [id number]  [phone]                GI PROGRESS NOTE  Jose Bui, [facility]  Work Cell: [phone]      NAME:   Sujit Vanessa   :    1960   MRN:    [id number]     Assessment:   1. Severe normochromic anemia  2. Thrombocytopenia and splenomegaly  3. Reported but unconfirmed melena stools heme(-)            Active Problems:    Anemia (2/15/2020)               Plan:   Recommend IV hydration, transfusion, Hematologic w/u in progress. No endoscopy at this point unless he develops overt GI bleed  He well could have gastritis associated with his NSAIDs and significant blood loss associated with his thrombocytopenia but will wait to better define anemia.              Patient Active Problem List   Diagnosis Code    Anemia D64.9       Subjective:     Sujit Vanessa is a 62 y.o.  male who was admitted with a history of anemia. He is having a bone marrow test today. He ate yesterday and had a brownish stool. No blood noticed. He has been transfused x 4 and last Hgb is 8.1. He believes this is a blood disorder, not a GI issue. He has had some nausea but no vomiting. Objective:     VITALS:   Last 24hrs VS reviewed since prior hospitalist progress note.  Most recent are:  Visit Vitals  /75 (BP 1 Location: Right arm, BP Patient Position: At rest;Supine)   Pulse 97   Temp 98.3 °F (36.8 °C)   Resp 18   Ht 6' 3\" (1.905 m)   Wt 107.9 kg (237 lb 14 oz)   SpO2 97%   BMI 29.73 kg/m²       Intake/Output Summary (Last 24 hours) at 2020 1134  Last data filed at 2020 1718  Gross per 24 hour   Intake 480 ml   Output    Net 480 ml        PHYSICAL EXAM:  General   well developed, well nourished, in no acute distress  EENT  Normocephalic, Atraumatic, PERRLA, EOMI, sclera clear  Respiratory   Clear To Auscultation bilaterally -  Cardiology  Regular Rate and Rythmn  -  Abdominal  Soft, non-tender, non-distended, positive bowel sounds, no hepatosplenomegaly, no palpable mass  Neurological  No focal neurological deficits noted  Psychological  Oriented x 3. Normal affect. Lab Data   Recent Results (from the past 12 hour(s))   CBC WITH AUTOMATED DIFF    Collection Time: 02/17/20 12:47 AM   Result Value Ref Range    WBC 4.5 4.1 - 11.1 K/uL    RBC 2.23 (L) 4.10 - 5.70 M/uL    HGB 6.7 (L) 12.1 - 17.0 g/dL    HCT 19.5 (L) 36.6 - 50.3 %    MCV 87.4 80.0 - 99.0 FL    MCH 30.0 26.0 - 34.0 PG    MCHC 34.4 30.0 - 36.5 g/dL    RDW 13.1 11.5 - 14.5 %    PLATELET 18 (LL) 231 - 400 K/uL    NRBC 0.0 0  WBC    ABSOLUTE NRBC 0.00 0.00 - 0.01 K/uL    NEUTROPHILS 71 32 - 75 %    LYMPHOCYTES 24 12 - 49 %    MONOCYTES 5 5 - 13 %    EOSINOPHILS 0 0 - 7 %    BASOPHILS 0 0 - 1 %    IMMATURE GRANULOCYTES 0 %    ABS. NEUTROPHILS 3.2 1.8 - 8.0 K/UL    ABS. LYMPHOCYTES 1.1 0.8 - 3.5 K/UL    ABS. MONOCYTES 0.2 0.0 - 1.0 K/UL    ABS. EOSINOPHILS 0.0 0.0 - 0.4 K/UL    ABS. BASOPHILS 0.0 0.0 - 0.1 K/UL    ABS. IMM. GRANS. 0.0 K/UL    DF MANUAL      RBC COMMENTS MICROCYTOSIS  2+        RBC COMMENTS HYPOCHROMIA  1+       CBC WITH AUTOMATED DIFF    Collection Time: 02/17/20 10:32 AM   Result Value Ref Range    WBC 4.8 4.1 - 11.1 K/uL    RBC 2.69 (L) 4.10 - 5.70 M/uL    HGB 8.1 (L) 12.1 - 17.0 g/dL    HCT 23.6 (L) 36.6 - 50.3 %    MCV 87.7 80.0 - 99.0 FL    MCH 30.1 26.0 - 34.0 PG    MCHC 34.3 30.0 - 36.5 g/dL    RDW 13.0 11.5 - 14.5 %    PLATELET 16 (LL) 025 - 400 K/uL    MPV 10.6 8.9 - 12.9 FL    NRBC 0.0 0  WBC    ABSOLUTE NRBC 0.00 0.00 - 0.01 K/uL    NEUTROPHILS PENDING %    LYMPHOCYTES PENDING %    MONOCYTES PENDING %    EOSINOPHILS PENDING %    BASOPHILS PENDING %    IMMATURE GRANULOCYTES PENDING %    ABS. NEUTROPHILS PENDING K/UL    ABS. LYMPHOCYTES PENDING K/UL    ABS. MONOCYTES PENDING K/UL    ABS. EOSINOPHILS PENDING K/UL    ABS. BASOPHILS PENDING K/UL    ABS. IMM. GRANS.  PENDING K/UL    DF PENDING          Medications: Reviewed    PMH/SH reviewed - no change compared to H&P  Mid-Level Provider: JOSELYN Cowan   Date/Time:  2/17/2020

## 2020-02-17 NOTE — PROGRESS NOTES
Hospitalist Progress Note      Hospital summary: A 62year old male patient with PMH of HTN and arthritis presented to ED for progressive worsening of shortness since 1 month. Patient has been sob more than 1 month which has significantly worsened since last week. He is sob with minimal exertion. Some chest discomfort with sob and he becomes tachycardiac. C/o nausea since 1 month and poor appetite. 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 He lost appetite this week. He has small skin cut few days ago which bled a lot. No similar episodes with bleeding in the past. Denied any bruising or purpura. His father had hx ITP at age 63's. He denied abd pain, hematuria, dizziness or weakness. He does take meloxicam daily and Advil once a week for headache. He has last physical exam done 1 year ago and lab work was normal. He is due for colonoscopy this year  In ED, blood work showed hb 5.7 and platelets 21. 1U PRBC and platelets ordered. GI stat consulted. He was given PPI and zofran. Hospitalist consulted for admission  2/15/2020      Assessment/Plan:  Acute symptomatic anemia  - pt presented with sob  - hb 5.7 on poa  - s/p 3U PRBC on 2/15.   - less likely GI bleed. High suspicion for hematological etiology   - elevated Vit b12, normal folate and iron levels  - fobt negative   - JAK2,  hepatitis B pending. Haptoglobin, SARAI, Hep A and C negative  - CT abd: No active gastrointestinal extravasation identified. colonic diverticulosis. - GI and hematology on board   - IVF, IV PPI bid  - GI not planning for any procedures  -  CT guided biopsy of bone done today  - hb 6.7 last night. S/p I U PRBC. Hb 8.1 this am  -  will monitor Hb closely     Thrombocytopenia 21 on poa  -  s/p1U platelets transfusion 2/14  - hematology on board.   - 16 today  - will monitor, transfuse <10,000     HTN - BP stable. C/w home meds Bystolic   Arthritis - home meds tramadol prn. meloxicam on hold      Ambulation: independent  DVT ppx: SCDs  Code status: Full. Wife is NOK  Disposition: TBD. Home when ready   ----------------------------------------------    CC: sob    S: Patient is seen and examined at bedside. Wife present. Patient is frustrated and upset regarding his current health condition. He joined a new job last month and he is worried about insurance and losing his job. Tried to reassure him to my best ability. He did mention some SI to CM but he clarified to me that he does not mean it and has no active SI. He has a clear mind and good insight. Review of Systems:  A comprehensive review of systems was negative except for that written in the HPI.     O:  Visit Vitals  /75 (BP 1 Location: Right arm, BP Patient Position: At rest;Supine)   Pulse 97   Temp 98.3 °F (36.8 °C)   Resp 18   Ht 6' 3\" (1.905 m)   Wt 107.9 kg (237 lb 14 oz)   SpO2 97%   BMI 29.73 kg/m²       PHYSICAL EXAM:  Gen: NAD  HEENT: anicteric sclerae, normal conjunctiva, oropharynx clear, MM moist  Neck: supple, trachea midline, no adenopathy  Heart: RRR, no MRG, no JVD, no peripheral edema  Lungs: CTA b/l, non-labored respirations  Abd: soft, NT, ND, BS+, no organomegaly  Extr: warm  Skin: dry, no rash  Neuro: CN II-XII grossly intact, normal speech, moves all extremities  Psych: frustrated       Intake/Output Summary (Last 24 hours) at 2/17/2020 1127  Last data filed at 2/16/2020 1718  Gross per 24 hour   Intake 480 ml   Output    Net 480 ml        Recent labs & imaging reviewed:  Recent Results (from the past 24 hour(s))   CBC WITH AUTOMATED DIFF    Collection Time: 02/16/20  4:55 PM   Result Value Ref Range    WBC 5.0 4.1 - 11.1 K/uL    RBC 2.53 (L) 4.10 - 5.70 M/uL    HGB 7.7 (L) 12.1 - 17.0 g/dL    HCT 22.2 (L) 36.6 - 50.3 %    MCV 87.7 80.0 - 99.0 FL    MCH 30.4 26.0 - 34.0 PG    MCHC 34.7 30.0 - 36.5 g/dL    RDW 13.2 11.5 - 14.5 %    PLATELET 20 (LL) 617 - 400 K/uL    MPV 11.3 8.9 - 12.9 FL    NRBC 0.0 0  WBC    ABSOLUTE NRBC 0.00 0.00 - 0.01 K/uL    NEUTROPHILS 71 32 - 75 %    LYMPHOCYTES 17 12 - 49 %    MONOCYTES 11 5 - 13 %    EOSINOPHILS 0 0 - 7 %    BASOPHILS 1 0 - 1 %    IMMATURE GRANULOCYTES 0 %    ABS. NEUTROPHILS 3.4 1.8 - 8.0 K/UL    ABS. LYMPHOCYTES 0.9 0.8 - 3.5 K/UL    ABS. MONOCYTES 0.6 0.0 - 1.0 K/UL    ABS. EOSINOPHILS 0.0 0.0 - 0.4 K/UL    ABS. BASOPHILS 0.1 0.0 - 0.1 K/UL    ABS. IMM. GRANS. 0.0 K/UL    DF MANUAL      RBC COMMENTS NORMOCYTIC, NORMOCHROMIC      WBC COMMENTS ATYPICAL LYMPHOCYTES PRESENT     CBC WITH AUTOMATED DIFF    Collection Time: 02/17/20 12:47 AM   Result Value Ref Range    WBC 4.5 4.1 - 11.1 K/uL    RBC 2.23 (L) 4.10 - 5.70 M/uL    HGB 6.7 (L) 12.1 - 17.0 g/dL    HCT 19.5 (L) 36.6 - 50.3 %    MCV 87.4 80.0 - 99.0 FL    MCH 30.0 26.0 - 34.0 PG    MCHC 34.4 30.0 - 36.5 g/dL    RDW 13.1 11.5 - 14.5 %    PLATELET 18 (LL) 379 - 400 K/uL    NRBC 0.0 0  WBC    ABSOLUTE NRBC 0.00 0.00 - 0.01 K/uL    NEUTROPHILS 71 32 - 75 %    LYMPHOCYTES 24 12 - 49 %    MONOCYTES 5 5 - 13 %    EOSINOPHILS 0 0 - 7 %    BASOPHILS 0 0 - 1 %    IMMATURE GRANULOCYTES 0 %    ABS. NEUTROPHILS 3.2 1.8 - 8.0 K/UL    ABS. LYMPHOCYTES 1.1 0.8 - 3.5 K/UL    ABS. MONOCYTES 0.2 0.0 - 1.0 K/UL    ABS. EOSINOPHILS 0.0 0.0 - 0.4 K/UL    ABS. BASOPHILS 0.0 0.0 - 0.1 K/UL    ABS. IMM.  GRANS. 0.0 K/UL    DF MANUAL      RBC COMMENTS MICROCYTOSIS  2+        RBC COMMENTS HYPOCHROMIA  1+       CBC WITH AUTOMATED DIFF    Collection Time: 02/17/20 10:32 AM   Result Value Ref Range    WBC 4.8 4.1 - 11.1 K/uL    RBC 2.69 (L) 4.10 - 5.70 M/uL    HGB 8.1 (L) 12.1 - 17.0 g/dL    HCT 23.6 (L) 36.6 - 50.3 %    MCV 87.7 80.0 - 99.0 FL    MCH 30.1 26.0 - 34.0 PG    MCHC 34.3 30.0 - 36.5 g/dL    RDW 13.0 11.5 - 14.5 %    PLATELET 16 (LL) 371 - 400 K/uL    MPV 10.6 8.9 - 12.9 FL    NRBC 0.0 0  WBC ABSOLUTE NRBC 0.00 0.00 - 0.01 K/uL    NEUTROPHILS PENDING %    LYMPHOCYTES PENDING %    MONOCYTES PENDING %    EOSINOPHILS PENDING %    BASOPHILS PENDING %    IMMATURE GRANULOCYTES PENDING %    ABS. NEUTROPHILS PENDING K/UL    ABS. LYMPHOCYTES PENDING K/UL    ABS. MONOCYTES PENDING K/UL    ABS. EOSINOPHILS PENDING K/UL    ABS. BASOPHILS PENDING K/UL    ABS. IMM. GRANS. PENDING K/UL    DF PENDING      Recent Labs     02/17/20  1032 02/17/20  0047   WBC 4.8 4.5   HGB 8.1* 6.7*   HCT 23.6* 19.5*   PLT 16* 18*     Recent Labs     02/16/20  0435 02/15/20  0843    139   K 3.9 3.8   * 109*   CO2 24 24   BUN 12 16   CREA 1.06 1.19   * 153*   CA 7.7* 8.5   MG  --  2.1     Recent Labs     02/15/20  1746 02/15/20  0843   SGOT  --  19   ALT  --  37   AP  --  89   TBILI 1.0 1.2*   TP  --  7.3   ALB  --  3.7   GLOB  --  3.6     Recent Labs     02/15/20  0843   INR 1.3*   PTP 13.2*      Recent Labs     02/15/20  0853   TIBC 199*   PSAT 62*   FERR 597*      Lab Results   Component Value Date/Time    Folate 6.3 02/15/2020 08:43 AM      No results for input(s): PH, PCO2, PO2 in the last 72 hours.   Recent Labs     02/15/20  0843   TROIQ <0.05     No results found for: CHOL, CHOLX, CHLST, CHOLV, HDL, HDLP, LDL, LDLC, DLDLP, TGLX, TRIGL, TRIGP, CHHD, CHHDX  No results found for: GLUCPOC  No results found for: COLOR, APPRN, SPGRU, REFSG, JEET, PROTU, GLUCU, KETU, BILU, UROU, KATTY, LEUKU, GLUKE, EPSU, BACTU, WBCU, RBCU, CASTS, UCRY    Med list reviewed  Current Facility-Administered Medications   Medication Dose Route Frequency    0.9% sodium chloride infusion 250 mL  250 mL IntraVENous PRN    melatonin tablet 3 mg  3 mg Oral QHS PRN    0.9% sodium chloride infusion 250 mL  250 mL IntraVENous PRN    sodium chloride (NS) flush 5-40 mL  5-40 mL IntraVENous Q8H    sodium chloride (NS) flush 5-40 mL  5-40 mL IntraVENous PRN    0.9% sodium chloride infusion  75 mL/hr IntraVENous CONTINUOUS    acetaminophen (TYLENOL) tablet 650 mg  650 mg Oral Q4H PRN    ondansetron (ZOFRAN) injection 4 mg  4 mg IntraVENous Q4H PRN    pantoprazole (PROTONIX) 40 mg in 0.9% sodium chloride 10 mL injection  40 mg IntraVENous Q12H    nebivolol (BYSTOLIC) tablet 5 mg  5 mg Oral DAILY    traMADol (ULTRAM) tablet 50 mg  50 mg Oral Q6H PRN    dorzolamide (TRUSOPT) 2 % ophthalmic solution 1 Drop  1 Drop Both Eyes TID    And    timolol (TIMOPTIC) 0.5 % ophthalmic solution 1 Drop  1 Drop Both Eyes BID    0.9% sodium chloride infusion 250 mL  250 mL IntraVENous PRN    0.9% sodium chloride infusion 250 mL  250 mL IntraVENous PRN       Care Plan discussed with:  Patient/Family and Nurse    Maurice Weinstein MD  Internal Medicine  Date of Service: 2/17/2020

## 2020-02-18 VITALS
OXYGEN SATURATION: 98 % | TEMPERATURE: 97.3 F | HEART RATE: 98 BPM | WEIGHT: 239.42 LBS | SYSTOLIC BLOOD PRESSURE: 121 MMHG | HEIGHT: 75 IN | BODY MASS INDEX: 29.77 KG/M2 | RESPIRATION RATE: 18 BRPM | DIASTOLIC BLOOD PRESSURE: 73 MMHG

## 2020-02-18 PROBLEM — D69.6 THROMBOCYTOPENIA (HCC): Status: ACTIVE | Noted: 2020-02-18

## 2020-02-18 LAB
ABO + RH BLD: NORMAL
BASOPHILS # BLD: 0 K/UL (ref 0–0.1)
BASOPHILS NFR BLD: 0 % (ref 0–1)
BLD PROD TYP BPU: NORMAL
BLOOD GROUP ANTIBODIES SERPL: NORMAL
BPU ID: NORMAL
CROSSMATCH RESULT,%XM: NORMAL
DIFFERENTIAL METHOD BLD: ABNORMAL
EOSINOPHIL # BLD: 0 K/UL (ref 0–0.4)
EOSINOPHIL NFR BLD: 0 % (ref 0–7)
ERYTHROCYTE [DISTWIDTH] IN BLOOD BY AUTOMATED COUNT: 13.1 % (ref 11.5–14.5)
HCT VFR BLD AUTO: 22.9 % (ref 36.6–50.3)
HGB BLD-MCNC: 8 G/DL (ref 12.1–17)
IMM GRANULOCYTES # BLD AUTO: 0 K/UL
IMM GRANULOCYTES NFR BLD AUTO: 0 %
LYMPHOCYTES # BLD: 0.8 K/UL (ref 0.8–3.5)
LYMPHOCYTES NFR BLD: 18 % (ref 12–49)
Lab: NORMAL
MCH RBC QN AUTO: 30.5 PG (ref 26–34)
MCHC RBC AUTO-ENTMCNC: 34.9 G/DL (ref 30–36.5)
MCV RBC AUTO: 87.4 FL (ref 80–99)
MONOCYTES # BLD: 0.4 K/UL (ref 0–1)
MONOCYTES NFR BLD: 8 % (ref 5–13)
NEUTS BAND NFR BLD MANUAL: 1 % (ref 0–6)
NEUTS SEG # BLD: 3.4 K/UL (ref 1.8–8)
NEUTS SEG NFR BLD: 73 % (ref 32–75)
NRBC # BLD: 0 K/UL (ref 0–0.01)
NRBC BLD-RTO: 0 PER 100 WBC
PLATELET # BLD AUTO: 15 K/UL (ref 150–400)
PMV BLD AUTO: 12.5 FL (ref 8.9–12.9)
RBC # BLD AUTO: 2.62 M/UL (ref 4.1–5.7)
RBC MORPH BLD: ABNORMAL
REFERENCE LAB,REFLB: NORMAL
SPECIMEN EXP DATE BLD: NORMAL
STATUS OF UNIT,%ST: NORMAL
TEST DESCRIPTION:,ATST: NORMAL
UNIT DIVISION, %UDIV: 0
WBC # BLD AUTO: 4.6 K/UL (ref 4.1–11.1)

## 2020-02-18 PROCEDURE — 74011000250 HC RX REV CODE- 250: Performed by: INTERNAL MEDICINE

## 2020-02-18 PROCEDURE — C9113 INJ PANTOPRAZOLE SODIUM, VIA: HCPCS | Performed by: INTERNAL MEDICINE

## 2020-02-18 PROCEDURE — 36415 COLL VENOUS BLD VENIPUNCTURE: CPT

## 2020-02-18 PROCEDURE — 74011250636 HC RX REV CODE- 250/636: Performed by: INTERNAL MEDICINE

## 2020-02-18 PROCEDURE — 74011250637 HC RX REV CODE- 250/637: Performed by: INTERNAL MEDICINE

## 2020-02-18 PROCEDURE — 85025 COMPLETE CBC W/AUTO DIFF WBC: CPT

## 2020-02-18 RX ORDER — PANTOPRAZOLE SODIUM 40 MG/1
40 TABLET, DELAYED RELEASE ORAL DAILY
Qty: 30 TAB | Refills: 0 | Status: SHIPPED | OUTPATIENT
Start: 2020-02-18

## 2020-02-18 RX ORDER — ONDANSETRON 4 MG/1
4 TABLET, FILM COATED ORAL
Qty: 30 TAB | Refills: 0 | Status: SHIPPED | OUTPATIENT
Start: 2020-02-18

## 2020-02-18 RX ADMIN — ONDANSETRON 4 MG: 2 INJECTION INTRAMUSCULAR; INTRAVENOUS at 08:12

## 2020-02-18 RX ADMIN — SODIUM CHLORIDE 40 MG: 9 INJECTION INTRAMUSCULAR; INTRAVENOUS; SUBCUTANEOUS at 08:13

## 2020-02-18 RX ADMIN — Medication 10 ML: at 06:38

## 2020-02-18 RX ADMIN — NEBIVOLOL HYDROCHLORIDE 5 MG: 2.5 TABLET ORAL at 08:12

## 2020-02-18 RX ADMIN — TIMOLOL MALEATE 1 DROP: 5 SOLUTION/ DROPS OPHTHALMIC at 08:14

## 2020-02-18 RX ADMIN — DORZOLAMIDE HYDROCHLORIDE 1 DROP: 20 SOLUTION/ DROPS OPHTHALMIC at 08:14

## 2020-02-18 NOTE — PROGRESS NOTES
Problem: Falls - Risk of  Goal: *Absence of Falls  Description  Document Sheila Resendiz Fall Risk and appropriate interventions in the flowsheet.   Outcome: Progressing Towards Goal  Note: Fall Risk Interventions:            Medication Interventions: Patient to call before getting OOB, Teach patient to arise slowly

## 2020-02-18 NOTE — PROGRESS NOTES
Cancer Shepherd at [address], [address] [place], [address] Dietrich: [phone]  F: [phone]    Reason for Visit:   Concha Mancuso is a 62 y.o. male who is seen in consultation at the request of Dr. Ameena Melvin for evaluation of anemia and thrombocytopenia. Treatment History:   · Bone marrow aspiration and biopsy 2/17/2020    History of Present Illness: The patient is a very pleasant 57-year-old gentleman who presents with a least a 1 week history of increasing fatigue he did start some iron pills about 6 months ago which makes him wonder if things really did not start much before the present time but over the last week he is gotten much more symptomatic and that when he got back from [place] on Friday night he just knew he had to go to the emergency room and get some help at which time he was found to be significantly anemic with thrombocytopenia and for that reason was admitted. The patient has not had any bleeding or bruising. Patient has not had any hematemesis melena hematochezia hemoptysis or hematuria. He has had some dark stools and they were thought to be dark because of his iron supplementation. He has had some constipation and has had to use a suppository. The patient in general has done very well he considers himself to be very healthy he continues to work full-time he recently changed jobs and his insurance changed as well he is last physical exam was about a year ago time. The patient has lost a little bit of weight he has been trying to get lost about 4 pounds and then in the last week he really ate very little so he lost more over the last week. He denied any other symptomatology denies any other GI  musculoskeletal respiratory or cardiac issues. 2/16/2020  The patient's reticulocyte count is low we probably have a decreased proliferative state the bone marrow will tell us a lot about what is going on.   He also has splenomegaly and so we will do a fiber sure for [place] to see whether there is any evidence for scar tissue in his liver resulting in the splenomegaly. He may very well have myeloproliferative disorder which is causing these problems. The bone marrow will give us the most information. 2/17/2020  Patient had his bone marrow aspiration and biopsy done today. Still waiting on blood work. The patient's platelet count today is 16,000 will probably need platelets in the next day or 2.    2/18   Patient states he tolerated his bone marrow biopsy well. He is anxious to return home today and irritable about not knowing the diagnosis of his anemia/thrombocytopenia. We agree he is OK to discharge today but will require close follow up with low plt counts. History reviewed. No pertinent past medical history. History reviewed. No pertinent surgical history. Social History     Tobacco Use    Smoking status: Former Smoker    Smokeless tobacco: Never Used   Substance Use Topics    Alcohol use: Yes     Frequency: Never     Comment: Rare      History reviewed. No pertinent family history.   Current Facility-Administered Medications   Medication Dose Route Frequency    0.9% sodium chloride infusion 250 mL  250 mL IntraVENous PRN    melatonin tablet 3 mg  3 mg Oral QHS PRN    0.9% sodium chloride infusion 250 mL  250 mL IntraVENous PRN    sodium chloride (NS) flush 5-40 mL  5-40 mL IntraVENous Q8H    sodium chloride (NS) flush 5-40 mL  5-40 mL IntraVENous PRN    acetaminophen (TYLENOL) tablet 650 mg  650 mg Oral Q4H PRN    ondansetron (ZOFRAN) injection 4 mg  4 mg IntraVENous Q4H PRN    pantoprazole (PROTONIX) 40 mg in 0.9% sodium chloride 10 mL injection  40 mg IntraVENous Q12H    nebivolol (BYSTOLIC) tablet 5 mg  5 mg Oral DAILY    traMADol (ULTRAM) tablet 50 mg  50 mg Oral Q6H PRN    dorzolamide (TRUSOPT) 2 % ophthalmic solution 1 Drop  1 Drop Both Eyes TID    And    timolol (TIMOPTIC) 0.5 % ophthalmic solution 1 Drop  1 Drop Both Eyes BID    0.9% sodium chloride infusion 250 mL  250 mL IntraVENous PRN    0.9% sodium chloride infusion 250 mL  250 mL IntraVENous PRN     Current Outpatient Medications   Medication Sig    ondansetron hcl (ZOFRAN) 4 mg tablet Take 1 Tab by mouth every eight (8) hours as needed for Nausea or Vomiting.  pantoprazole (PROTONIX) 40 mg tablet Take 1 Tab by mouth daily.  nebivolol (BYSTOLIC) 5 mg tablet Take 5 mg by mouth daily. Indications: high blood pressure    traMADol (ULTRAM) 50 mg tablet Take 50 mg by mouth every six (6) hours as needed for Pain.  dorzolamide HCl/timolol maleat (DORZOLAMIDE-TIMOLOL OP) Apply 1 Drop to eye two (2) times a day. One drop each eye 2.23%/0.68%      Allergies   Allergen Reactions    Codeine Nausea and Vomiting        Review of Systems: A complete review of systems was obtained, negative except as described above. Physical Exam:     Visit Vitals  /73 (BP 1 Location: Right arm)   Pulse 98   Temp 97.3 °F (36.3 °C)   Resp 18   Ht 6' 3\" (1.905 m)   Wt 239 lb 6.7 oz (108.6 kg)   SpO2 98%   BMI 29.93 kg/m²     ECOG PS: 0  General: No distress, agitated  Eyes: PERRLA, anicteric sclerae  HENT: Atraumatic, OP clear  Neck: Supple  Lymphatic: No cervical, supraclavicular, or inguinal adenopathy  Respiratory:  normal respiratory effort  MS: Digits without clubbing or cyanosis. Skin: No rashes, ecchymoses, or petechiae. Normal temperature, turgor, and texture. Psych: Alert, oriented, appropriate affect, normal judgment/insight    Results:     Lab Results   Component Value Date/Time    WBC 4.6 02/18/2020 03:33 AM    HGB 8.0 (L) 02/18/2020 03:33 AM    HCT 22.9 (L) 02/18/2020 03:33 AM    PLATELET 15 (LL) 21/73/8944 03:33 AM    MCV 87.4 02/18/2020 03:33 AM    ABS.  NEUTROPHILS 3.4 02/18/2020 03:33 AM     Lab Results   Component Value Date/Time    Sodium 140 02/16/2020 04:35 AM    Potassium 3.9 02/16/2020 04:35 AM    Chloride 111 (H) 02/16/2020 04:35 AM    CO2 24 02/16/2020 04:35 AM    Glucose 102 (H) 02/16/2020 04:35 AM    BUN 12 02/16/2020 04:35 AM    Creatinine 1.06 02/16/2020 04:35 AM    GFR est AA >60 02/16/2020 04:35 AM    GFR est non-AA >60 02/16/2020 04:35 AM    Calcium 7.7 (L) 02/16/2020 04:35 AM     Lab Results   Component Value Date/Time    Bilirubin, total 1.0 02/15/2020 05:46 PM    ALT (SGPT) 37 02/15/2020 08:43 AM    AST (SGOT) 19 02/15/2020 08:43 AM    Alk. phosphatase 89 02/15/2020 08:43 AM    Protein, total 7.3 02/15/2020 08:43 AM    Albumin 3.7 02/15/2020 08:43 AM    Globulin 3.6 02/15/2020 08:43 AM         Records reviewed and summarized above. Pathology report(s) reviewed above. Radiology report(s) reviewed above. Assessment:   1) Anemia with Concurrent Thrombocytopenia  Normocytic of undetermined etiology with a low retic count. Bone marrow aspiration and biopsy was 2/17/2020.    2) Splenomegaly   Work-up is in progress. 3)Psychosocial  Wife at bedside. Has many relatives in medical field. Wants to be discharged today. Agree with this but will need close follow-up. Plan:     · We will plan to make appt in [facility] for CBC and type and screen on Thursday morning. · Will schedule follow up appt in clinic to review BM biopsy results Friday at 2:30pm.  · We will plan on transfusing if hemoglobin less than 7.  · We will transfuse platelets if platelet count less than 10,000. I appreciate the opportunity to participate in Mr. Martell montes.     Signed By: Abilio Graham NP

## 2020-02-18 NOTE — PROGRESS NOTES
[facility].  [address] [place] [address]  [phone]                GI PROGRESS NOTE  [facility] ORIN [facility]  Work Cell: [phone]      NAME:   Joselin Ibarra   :    1960   MRN:    [id number]     Assessment/Plan   1. Severe normochromic anemia  2. Thrombocytopenia and splenomegaly  3. Reported but unconfirmed melena stools heme(-)            Active Problems:    Anemia (2/15/2020)                 Plan:   Follow up with hematology out patient. Call Dr Perfecto Lemon regarding results of the bone marrow bx. Follow up in the office in one month           Patient Active Problem List   Diagnosis Code    Anemia D64.9       Subjective:     Joselin Ibarra  Is still bloated, nauseous, he had two crackers. He has an outpatient appointment scheduled with Thursday and Friday \"to check his levels\"  And meet with Dr Alejandro Flores for the results of the bone marrow bx. He is planning on being discharged today. Dr Perfecto Lemon asks for him to call about the results of the bx. And follow up in office in one month. The office will follow up with him on these requests. Objective:     VITALS:   Last 24hrs VS reviewed since prior hospitalist progress note. Most recent are:  Visit Vitals  /73 (BP 1 Location: Right arm)   Pulse 98   Temp 97.3 °F (36.3 °C)   Resp 18   Ht 6' 3\" (1.905 m)   Wt 108.6 kg (239 lb 6.7 oz)   SpO2 98%   BMI 29.93 kg/m²       Intake/Output Summary (Last 24 hours) at 2020 1519  Last data filed at 2020 0800  Gross per 24 hour   Intake 675 ml   Output    Net 675 ml        PHYSICAL EXAM:  General  in no acute distress  EENT  Normocephalic, Atraumatic, PERRLA, EOMI, sclera clear  Respiratory   Clear To Auscultation bilaterally   Cardiology  Regular Rate and Rythmn    Abdominal  positive bowel sounds, no hepatosplenomegaly, no palpable mass  Neurological  No focal nendereurological deficits noted  Psychological  Oriented x 3. Normal affect.        Lab Data Recent Results (from the past 12 hour(s))   CBC WITH AUTOMATED DIFF    Collection Time: 02/18/20  3:33 AM   Result Value Ref Range    WBC 4.6 4.1 - 11.1 K/uL    RBC 2.62 (L) 4.10 - 5.70 M/uL    HGB 8.0 (L) 12.1 - 17.0 g/dL    HCT 22.9 (L) 36.6 - 50.3 %    MCV 87.4 80.0 - 99.0 FL    MCH 30.5 26.0 - 34.0 PG    MCHC 34.9 30.0 - 36.5 g/dL    RDW 13.1 11.5 - 14.5 %    PLATELET 15 (LL) 595 - 400 K/uL    MPV 12.5 8.9 - 12.9 FL    NRBC 0.0 0  WBC    ABSOLUTE NRBC 0.00 0.00 - 0.01 K/uL    NEUTROPHILS 73 32 - 75 %    BAND NEUTROPHILS 1 0 - 6 %    LYMPHOCYTES 18 12 - 49 %    MONOCYTES 8 5 - 13 %    EOSINOPHILS 0 0 - 7 %    BASOPHILS 0 0 - 1 %    IMMATURE GRANULOCYTES 0 %    ABS. NEUTROPHILS 3.4 1.8 - 8.0 K/UL    ABS. LYMPHOCYTES 0.8 0.8 - 3.5 K/UL    ABS. MONOCYTES 0.4 0.0 - 1.0 K/UL    ABS. EOSINOPHILS 0.0 0.0 - 0.4 K/UL    ABS. BASOPHILS 0.0 0.0 - 0.1 K/UL    ABS. IMM.  GRANS. 0.0 K/UL    DF MANUAL      RBC COMMENTS NORMOCYTIC, NORMOCHROMIC           Medications: Reviewed    PMH/SH reviewed - no change compared to H&P  Mid-Level Provider: JOSELYN Cisneros   Date/Time:  2/18/2020

## 2020-02-18 NOTE — PROGRESS NOTES
Charted 2/18/20:    Spiritual Care Partner Volunteer visited patient in Rm 361 on 2/17/20. Documented by:   Chaplain Ortiz MDiv, MACE  [phone] PR[address] ([phone])

## 2020-02-18 NOTE — DISCHARGE SUMMARY
Discharge Summary     Patient:  Stacie Aly       MRN: [id number]       YOB: 1960       Age: 62 y.o. Date of admission:  2/15/2020    Date of discharge:  2/18/2020    Primary care provider: Dr. Virgilio Rose MD    Admitting provider:  Rashawn Marcial MD    Discharging provider:  Ailyn Winter [address] [phone]. If unavailable, call [phone] and ask the  to page the triage hospitalist.    Consultations  · IP CONSULT TO GASTROENTEROLOGY  · IP CONSULT TO HEMATOLOGY  · IP CONSULT TO HOSPITALIST    Procedures  · Procedure(s):  · ESOPHAGOGASTRODUODENOSCOPY (EGD)    Discharge destination: home. The patient is stable for discharge. Admission diagnosis  · Anemia [D64.9]    Current Discharge Medication List      START taking these medications    Details   ondansetron hcl (ZOFRAN) 4 mg tablet Take 1 Tab by mouth every eight (8) hours as needed for Nausea or Vomiting. Qty: 30 Tab, Refills: 0      pantoprazole (PROTONIX) 40 mg tablet Take 1 Tab by mouth daily. Qty: 30 Tab, Refills: 0         CONTINUE these medications which have NOT CHANGED    Details   nebivolol (BYSTOLIC) 5 mg tablet Take 5 mg by mouth daily. Indications: high blood pressure      traMADol (ULTRAM) 50 mg tablet Take 50 mg by mouth every six (6) hours as needed for Pain.      dorzolamide HCl/timolol maleat (DORZOLAMIDE-TIMOLOL OP) Apply 1 Drop to eye two (2) times a day. One drop each eye 2.23%/0.68%         STOP taking these medications       meloxicam (MOBIC) 15 mg tablet Comments:   Reason for Stopping:                Follow-up Information     Follow up With Specialties Details Why Contact Info    Virgilio Rose MD [facility] In 1 week  [address] [facility] [address] Yarelsi Oconnor MD Oncology, Hematology In 3 days  [address] [place] [address] [address] [address]  [phone]            Final discharge diagnoses and brief hospital course  A 62year old male patient with PMH of HTN and arthritis presented to ED for progressive worsening of shortness since 1 month. Patient has been sob more than 1 month which has significantly worsened since last week. He is sob with minimal exertion. Some chest discomfort with sob and he becomes tachycardiac. C/o nausea since 1 month and poor appetite. 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 He lost appetite this week. He has small skin cut few days ago which bled a lot. No similar episodes with bleeding in the past. Denied any bruising or purpura. His father had hx ITP at age 63's. He denied abd pain, hematuria, dizziness or weakness. He does take meloxicam daily and Advil once a week for headache. He has last physical exam done 1 year ago and lab work was normal. He is due for colonoscopy this year  In ED, blood work showed hb 5.7 and platelets 21. 1U PRBC and platelets ordered. GI stat consulted. He was given PPI and zofran. Hospitalist consulted for admission  2/15/2020      Acute symptomatic anemia - unclear etiology   - pt presented with sob  - hb 5.7 on poa. s/p 3U PRBC on 2/15.   - less likely GI bleed. High suspicion for hematological etiology   - elevated Vit b12, normal folate and iron levels  - fobt negative   - Still some blood work pending. Haptoglobin, SARAI, Hep A and C negative  - CT abd: No active gastrointestinal extravasation identified. colonic diverticulosis. - GI and hematology on board   - GI not planning for any procedures  -  CT guided biopsy of bone done 2/17  -  S/p I U PRBC on 2/17  - hb stable today     Thrombocytopenia 21 on poa  -  s/p1U platelets transfusion 2/14  - hematology on board. - 15 today  - will monitor, transfuse <10,000     HTN - BP stable. C/w home meds Bystolic   Arthritis - home meds tramadol prn. meloxicam on hold     Discharge recommendations   - PCP f/u in 1 week  - oncology f/u on Friday 2/21 at 2.30pm  - cbc on 2/20    Discussed in detail regarding his discharge plan with patient and his wife at bedside along with oncology Dr. Kirk Lopez. They understood and agreed. Physical examination at discharge  Visit Vitals  /73 (BP 1 Location: Right arm)   Pulse 98   Temp 97.3 °F (36.3 °C)   Resp 18   Ht 6' 3\" (1.905 m)   Wt 108.6 kg (239 lb 6.7 oz)   SpO2 98%   BMI 29.93 kg/m²     Gen: NAD  HEENT: anicteric sclerae, normal conjunctiva, oropharynx clear, MM moist  Neck: supple, trachea midline, no adenopathy  Heart: RRR, no MRG, no JVD, no peripheral edema  Lungs: CTA b/l, non-labored respirations  Abd: soft, NT, ND, BS+, no organomegaly  Extr: warm  Skin: dry, no rash  Neuro: CN II-XII grossly intact, normal speech, moves all extremities  Psych: frustrated     Pertinent imaging studies:    None     Recent Labs     02/18/20  0333 02/17/20  1829 02/17/20  1032   WBC 4.6 4.3 4.8   HGB 8.0* 7.8* 8.1*   HCT 22.9* 22.2* 23.6*   PLT 15* 15* 16*     Recent Labs     02/16/20  0435      K 3.9   *   CO2 24   BUN 12   CREA 1.06   *   CA 7.7*     No results for input(s): SGOT, GPT, AP, TBIL, TP, ALB, GLOB, GGT, AML, LPSE in the last 72 hours. No lab exists for component: AMYP, HLPSE  No results for input(s): INR, PTP, APTT, INREXT in the last 72 hours. No results for input(s): FE, TIBC, PSAT, FERR in the last 72 hours. No results for input(s): PH, PCO2, PO2 in the last 72 hours. No results for input(s): CPK, CKMB in the last 72 hours.     No lab exists for component: TROPONINI  No components found for: [place]    Chronic Diagnoses:    Problem List as of 2/18/2020 Never Reviewed          Codes Class Noted - Resolved    Anemia ICD-10-CM: D64.9  ICD-9-CM: 285.9  2/15/2020 - Present              Time spent on discharge related activities today greater than 30 minutes.       Signed:  Randee Osler, MD                 Hospitalist, Internal Medicine      Cc: Radha Carver MD

## 2020-02-18 NOTE — PROGRESS NOTES
Bedside and Verbal shift change report given to Michelle RN (oncoming nurse) by Niurka Poole RN (offgoing nurse). Report included the following information SBAR, Kardex, Intake/Output, MAR, Recent Results and Cardiac Rhythm NSR.

## 2020-02-18 NOTE — PROGRESS NOTES
[address]  [facility] TELEMETRY  [address] [address] [phone]    Work/School Note    Date: 2/15/2020    To Whom It May concern:    Jono Collado was seen and treated today in the hospital from 2/15/2020 to 2/18/2020.     Sincerely,    Carmen Preston MD

## 2020-02-18 NOTE — PROGRESS NOTES
2/18/2020 -   GRACIELA:  - RUR: 9%  - Patient's bone marrow biopsy results are still pending  - Patient to discharge today, 2/18  - Disposition includes discharge to own home with transport via spouse  - Patient to follow up with PCP and hem-onc within 2 days - patient is aware of the above  CRM: Brandy Smith, MPH, [facility]; Z: [phone]

## 2020-02-18 NOTE — DISCHARGE INSTRUCTIONS
Patient Education        Bone Marrow Aspiration and Biopsy: What to Expect at Home  Your Recovery  The biopsy site may feel sore for several days. It can help to walk, take pain medicine, and put ice packs on the site. You will probably be able to return to work and your usual activities the day after the procedure. Your doctor or nurse will call you with the results of your test.  This care sheet gives you a general idea about how long it will take for you to recover. But each person recovers at a different pace. Follow the steps below to get better as quickly as possible. How can you care for yourself at home? Activity    · Rest when you feel tired. Getting enough sleep will help you recover.     · You may drive when you are no longer taking pain pills and can quickly move your foot from the gas pedal to the brake. You must also be able to sit comfortably for a long period of time, even if you do not plan to go far. You might get caught in traffic.     · Most people are able to return to work the day after the procedure. Medicines    · Your doctor will tell you if and when you can restart your medicines. He or she will also give you instructions about taking any new medicines.     · If you take blood thinners, such as warfarin (Coumadin), clopidogrel (Plavix), or aspirin, be sure to talk to your doctor. He or she will tell you if and when to start taking those medicines again. Make sure that you understand exactly what your doctor wants you to do.     · Be safe with medicines. Take pain medicines exactly as directed. ? If the doctor gave you a prescription medicine for pain, take it as prescribed. ? If you are not taking a prescription pain medicine, take an over-the-counter medicine such as acetaminophen (Tylenol), ibuprofen (Advil, Motrin), or naproxen (Aleve). Read and follow all instructions on the label. ? Do not take two or more pain medicines at the same time unless the doctor told you to.  Many pain medicines have acetaminophen, which is Tylenol. Too much acetaminophen (Tylenol) can be harmful.     · If you think your pain medicine is making you sick to your stomach:  ? Take your medicine after meals (unless your doctor has told you not to). ? Ask your doctor for a different pain medicine.     · If your doctor prescribed antibiotics, take them as directed. Do not stop taking them just because you feel better.    Ice    · Put ice or a cold pack on the biopsy site for 10 to 20 minutes at a time. Put a thin cloth between the ice and your skin. Follow-up care is a key part of your treatment and safety. Be sure to make and go to all appointments, and call your doctor if you are having problems. It's also a good idea to know your test results and keep a list of the medicines you take. When should you call for help? Call 911 anytime you think you may need emergency care. For example, call if:    · You passed out (lost consciousness).    Call your doctor now or seek immediate medical care if:    · You have signs of infection, such as:  ? Increased pain, swelling, warmth, or redness. ? Red streaks leading from the biopsy site. ? Pus draining from the biopsy site. ? Swollen lymph nodes in your neck, armpits, or groin. ? A fever.    Watch closely for any changes in your health, and be sure to contact your doctor if:    · You are not getting better as expected. Where can you learn more? Go to http://josh-neisha.info/. Enter E148 in the search box to learn more about \"Bone Marrow Aspiration and Biopsy: What to Expect at Home. \"  Current as of: March 28, 2019  Content Version: 12.2  © 4381-6975 Healthwise, Incorporated. Care instructions adapted under license by [employer] (which disclaims liability or warranty for this information).  If you have questions about a medical condition or this instruction, always ask your healthcare professional. Montez Muhammad any warranty or liability for your use of this information. Please bring this form with you to show your primary care provider at your follow-up appointment. Primary care provider:  Dr. Virgilio Rose MD    Discharging provider:  Rashawn Marcial MD    You have been admitted to the hospital with the following diagnoses:  · Anemia [D64.9]    FOLLOW-UP CARE RECOMMENDATIONS:    APPOINTMENTS:  · Follow-up with primary care provider, Dr. Virgilio Rose MD  -  Please call [phone] shortly after discharge to set up an appointment to be seen in  1 week   · Oncology in 3 days     FOLLOW-UP TESTS recommended: cbc in 2 days     PENDING TEST RESULTS:  At the time of your discharge the following test results are still pending: bone marrow biopsy  Please make sure you review these results with your outpatient follow-up provider(s). SYMPTOMS to watch for: chest pain, shortness of breath, fever, chills, nausea, vomiting, diarrhea, change in mentation, falling, weakness, bleeding. DIET/what to eat:  Regular Diet    ACTIVITY:  Activity as tolerated    What to do if new or unexpected symptoms occur? If you experience any of the above symptoms (or should other concerns or questions arise after discharge) please call your primary care physician. Return to the emergency room if you cannot get hold of your doctor. · It is very important that you keep your follow-up appointment(s). · Please bring discharge papers, medication list (and/or medication bottles) to your follow-up appointments for review by your outpatient provider(s). · Please check the list of medications and be sure it includes every medication (even non-prescription medications) that your provider wants you to take. · It is important that you take the medication exactly as they are prescribed. · Keep your medication in the bottles provided by the pharmacist and keep a list of the medication names, dosages, and times to be taken in your wallet. · Do not take other medications without consulting your doctor. · If you have any questions about your medications or other instructions, please talk to your nurse or care provider before you leave the hospital.    I understand that if any problems occur once I am at home I am to contact my physician. These instructions were explained to me and I had the opportunity to ask questions. DISCHARGE SUMMARY from [facility] discharge information has been reviewed with the patient. The patient verbalized understanding. Discharge medications reviewed with the patient and appropriate educational materials and side effects teaching were provided.

## 2020-02-19 LAB
ANTIBODIES PERFORMED, 502264: NORMAL
CD59 CELLS BLD QL: NORMAL
CD59 DEFICIENT GRANULOCYTES NFR BLD: NORMAL %
CELL POPULATION, PNH12T: NORMAL
COMMENT, 502274: NORMAL
COMMENT, PNH8T: NORMAL
DIRECTOR REVIEW, PNHL: NORMAL
MONOCYTES, 502262: NORMAL
SPECIMEN SOURCE: NORMAL
SUBMITTED DX, PNH10T: NORMAL
VIABLE CELLS NFR SPEC: NORMAL %

## 2020-02-20 ENCOUNTER — HOSPITAL ENCOUNTER (OUTPATIENT)
Dept: INFUSION THERAPY | Age: 60
Discharge: HOME | End: 2020-02-20
Payer: COMMERCIAL

## 2020-02-20 VITALS
HEART RATE: 90 BPM | DIASTOLIC BLOOD PRESSURE: 65 MMHG | RESPIRATION RATE: 18 BRPM | SYSTOLIC BLOOD PRESSURE: 120 MMHG | TEMPERATURE: 99.4 F

## 2020-02-20 DIAGNOSIS — R11.0 NAUSEA: Primary | ICD-10-CM

## 2020-02-20 DIAGNOSIS — D69.6 THROMBOCYTOPENIA (HCC): ICD-10-CM

## 2020-02-20 LAB
APPEARANCE UR: CLEAR
BACKGROUND: 489207: NORMAL
BACTERIA URNS QL MICRO: NEGATIVE /HPF
BASOPHILS # BLD: 0 K/UL (ref 0–0.1)
BASOPHILS NFR BLD: 0 % (ref 0–1)
BILIRUB UR QL CFM: POSITIVE
COLOR UR: ABNORMAL
DIFFERENTIAL METHOD BLD: ABNORMAL
DIRECTOR REVIEW: 489204: NORMAL
EOSINOPHIL # BLD: 0 K/UL (ref 0–0.4)
EOSINOPHIL NFR BLD: 0 % (ref 0–7)
EPITH CASTS URNS QL MICRO: ABNORMAL /LPF
ERYTHROCYTE [DISTWIDTH] IN BLOOD BY AUTOMATED COUNT: 13.1 % (ref 11.5–14.5)
GLUCOSE UR STRIP.AUTO-MCNC: NEGATIVE MG/DL
HCT VFR BLD AUTO: 21.5 % (ref 36.6–50.3)
HGB BLD-MCNC: 7.3 G/DL (ref 12.1–17)
HGB UR QL STRIP: ABNORMAL
HYALINE CASTS URNS QL MICRO: ABNORMAL /LPF (ref 0–5)
IMM GRANULOCYTES # BLD AUTO: 0 K/UL (ref 0–0.04)
IMM GRANULOCYTES NFR BLD AUTO: 0 % (ref 0–0.5)
JAK2 P.V617F BLD/T QL: NORMAL
KETONES UR QL STRIP.AUTO: ABNORMAL MG/DL
LEUKOCYTE ESTERASE UR QL STRIP.AUTO: ABNORMAL
LYMPHOCYTES # BLD: 0.5 K/UL (ref 0.8–3.5)
LYMPHOCYTES NFR BLD: 12 % (ref 12–49)
MCH RBC QN AUTO: 29.9 PG (ref 26–34)
MCHC RBC AUTO-ENTMCNC: 34 G/DL (ref 30–36.5)
MCV RBC AUTO: 88.1 FL (ref 80–99)
MONOCYTES # BLD: 0.6 K/UL (ref 0–1)
MONOCYTES NFR BLD: 16 % (ref 5–13)
NEUTS SEG # BLD: 2.7 K/UL (ref 1.8–8)
NEUTS SEG NFR BLD: 72 % (ref 32–75)
NITRITE UR QL STRIP.AUTO: NEGATIVE
NRBC # BLD: 0 K/UL (ref 0–0.01)
NRBC BLD-RTO: 0 PER 100 WBC
PH UR STRIP: 5 [PH] (ref 5–8)
PLATELET # BLD AUTO: 12 K/UL (ref 150–400)
PMV BLD AUTO: 9.7 FL (ref 8.9–12.9)
PROT UR STRIP-MCNC: NEGATIVE MG/DL
RBC # BLD AUTO: 2.44 M/UL (ref 4.1–5.7)
RBC #/AREA URNS HPF: ABNORMAL /HPF (ref 0–5)
RBC MORPH BLD: ABNORMAL
SP GR UR REFRACTOMETRY: 1.03 (ref 1–1.03)
UA: UC IF INDICATED,UAUC: ABNORMAL
UROBILINOGEN UR QL STRIP.AUTO: 2 EU/DL (ref 0.2–1)
WBC # BLD AUTO: 3.8 K/UL (ref 4.1–11.1)
WBC MORPH BLD: ABNORMAL
WBC URNS QL MICRO: ABNORMAL /HPF (ref 0–4)

## 2020-02-20 PROCEDURE — P9016 RBC LEUKOCYTES REDUCED: HCPCS

## 2020-02-20 PROCEDURE — 86920 COMPATIBILITY TEST SPIN: CPT

## 2020-02-20 PROCEDURE — 86900 BLOOD TYPING SEROLOGIC ABO: CPT

## 2020-02-20 PROCEDURE — 36415 COLL VENOUS BLD VENIPUNCTURE: CPT

## 2020-02-20 PROCEDURE — P9035 PLATELET PHERES LEUKOREDUCED: HCPCS

## 2020-02-20 PROCEDURE — 77030013169 SET IV BLD ICUM -A

## 2020-02-20 PROCEDURE — 85025 COMPLETE CBC W/AUTO DIFF WBC: CPT

## 2020-02-20 PROCEDURE — 74011000258 HC RX REV CODE- 258: Performed by: INTERNAL MEDICINE

## 2020-02-20 PROCEDURE — 81001 URINALYSIS AUTO W/SCOPE: CPT

## 2020-02-20 PROCEDURE — 36430 TRANSFUSION BLD/BLD COMPNT: CPT

## 2020-02-20 RX ORDER — SODIUM CHLORIDE 9 MG/ML
250 INJECTION, SOLUTION INTRAVENOUS AS NEEDED
Status: DISCONTINUED | OUTPATIENT
Start: 2020-02-20 | End: 2020-02-21 | Stop reason: HOSPADM

## 2020-02-20 RX ORDER — SODIUM CHLORIDE 0.9 % (FLUSH) 0.9 %
5-10 SYRINGE (ML) INJECTION AS NEEDED
Status: DISCONTINUED | OUTPATIENT
Start: 2020-02-20 | End: 2020-02-21 | Stop reason: HOSPADM

## 2020-02-20 RX ORDER — ONDANSETRON HYDROCHLORIDE 8 MG/1
8 TABLET, FILM COATED ORAL
Qty: 30 TAB | Refills: 1 | Status: SHIPPED | OUTPATIENT
Start: 2020-02-20

## 2020-02-20 RX ADMIN — SODIUM CHLORIDE 100 ML: 900 INJECTION, SOLUTION INTRAVENOUS at 11:20

## 2020-02-20 RX ADMIN — Medication 10 ML: at 11:20

## 2020-02-20 NOTE — PROGRESS NOTES
Outpatient Infusion Center Progress Note    8605 Pt admit to [facility] for Blood and Platelet Transfusion ambulatory in stable condition. Assessment completed. No new concerns voiced. Peripheral IV accessed in left AC with positive blood return. Labs drawn and sent for processing. PIV flushed and capped while awaiting lab results. 1000 Call received from the lab regarding critical low platelets. Results phoned in to Nivia Smith NP. Per Dr. Dino Yang we are going to give 1 unit Platelets and 1 unit PRBC. Orders and consent obtained. Medications:  NS KVO  1 unit Platelets  1 unit PRBCs    1130 Platelets initiated. 1207 Platelets completed. 1255 PRBC initiated. 1501 PRBC completed. Patient Vitals for the past 12 hrs:   Temp Pulse Resp BP   02/20/20 1539 (P) 99.7 °F (37.6 °C) (P) 92 (P) 18 (P) 118/72   02/20/20 1501 99.4 °F (37.4 °C) 90 18 120/65   02/20/20 1455 98.5 °F (36.9 °C) 94 18 115/73   02/20/20 1355 99.6 °F (37.6 °C) 93 18 107/66   02/20/20 1325 99.1 °F (37.3 °C) 90 18 107/64   02/20/20 1310 99.1 °F (37.3 °C) 90 18 118/67   02/20/20 1247 98.6 °F (37 °C) 63 18 102/63   02/20/20 1207 98.9 °F (37.2 °C) 87 18 108/67   02/20/20 1126 98.7 °F (37.1 °C) 86 18 111/64   02/20/20 0913 98.6 °F (37 °C) (!) 104 18 123/76     Pt monitored for 35 minutes post transfusion with no s/s of reaction, patient declined to wait the full hour. Educated and provided with written material regarding s/s of delayed reaction to watch for at home. 1540 Pt tolerated treatment well. D/c home ambulatory in no distress. PIV maintained positive blood return throughout treatment. PIV flushed and deaccessed per protocol. There are no other appointments scheduled for this patient at this time.      Recent Results (from the past 12 hour(s))   CBC WITH AUTOMATED DIFF    Collection Time: 02/20/20  9:20 AM   Result Value Ref Range    WBC 3.8 (L) 4.1 - 11.1 K/uL    RBC 2.44 (L) 4.10 - 5.70 M/uL    HGB 7.3 (L) 12.1 - 17.0 g/dL    HCT 21.5 (L) 36.6 - 50.3 %    MCV 88.1 80.0 - 99.0 FL    MCH 29.9 26.0 - 34.0 PG    MCHC 34.0 30.0 - 36.5 g/dL    RDW 13.1 11.5 - 14.5 %    PLATELET 12 (LL) 650 - 400 K/uL    MPV 9.7 8.9 - 12.9 FL    NRBC 0.0 0  WBC    ABSOLUTE NRBC 0.00 0.00 - 0.01 K/uL    NEUTROPHILS 72 32 - 75 %    LYMPHOCYTES 12 12 - 49 %    MONOCYTES 16 (H) 5 - 13 %    EOSINOPHILS 0 0 - 7 %    BASOPHILS 0 0 - 1 %    IMMATURE GRANULOCYTES 0 0.0 - 0.5 %    ABS. NEUTROPHILS 2.7 1.8 - 8.0 K/UL    ABS. LYMPHOCYTES 0.5 (L) 0.8 - 3.5 K/UL    ABS. MONOCYTES 0.6 0.0 - 1.0 K/UL    ABS. EOSINOPHILS 0.0 0.0 - 0.4 K/UL    ABS. BASOPHILS 0.0 0.0 - 0.1 K/UL    ABS. IMM.  GRANS. 0.0 0.00 - 0.04 K/UL    DF SMEAR SCANNED      RBC COMMENTS NORMOCYTIC, NORMOCHROMIC      WBC COMMENTS ATYPICAL LYMPHOCYTES PRESENT     TYPE & SCREEN    Collection Time: 02/20/20  9:20 AM   Result Value Ref Range    Crossmatch Expiration 02/23/2020     ABO/Rh(D) Manda Stager POSITIVE     Antibody screen NEG     Unit number [id number]     Blood component type RC LR     Unit division 00     Status of unit ISSUED     Crossmatch result Compatible    PLATELETS, ALLOCATE    Collection Time: 02/20/20 10:15 AM   Result Value Ref Range    Unit number [id number]     Blood component type PLTPH, LR     Unit division 00     Status of unit ISSUED    URINALYSIS W/ REFLEX CULTURE    Collection Time: 02/20/20  1:53 PM   Result Value Ref Range    Color DARK YELLOW      Appearance CLEAR CLEAR      Specific gravity 1.026 1.003 - 1.030      pH (UA) 5.0 5.0 - 8.0      Protein NEGATIVE  NEG mg/dL    Glucose NEGATIVE  NEG mg/dL    Ketone TRACE (A) NEG mg/dL    Blood TRACE (A) NEG      Urobilinogen 2.0 (H) 0.2 - 1.0 EU/dL    Nitrites NEGATIVE  NEG      Leukocyte Esterase TRACE (A) NEG      UA:UC IF INDICATED CULTURE NOT INDICATED BY UA RESULT      WBC 0-4 0 - 4 /hpf    RBC 0-5 0 - 5 /hpf    Epithelial cells FEW FEW /lpf    Bacteria NEGATIVE  NEG /hpf    Hyaline cast 0-2 0 - 5 /lpf   BILIRUBIN, CONFIRM    Collection Time: 02/20/20  1:53 PM   Result Value Ref Range    Bilirubin UA, confirm POSITIVE

## 2020-02-21 ENCOUNTER — OFFICE VISIT (OUTPATIENT)
Dept: ONCOLOGY | Age: 60
End: 2020-02-21

## 2020-02-21 VITALS
RESPIRATION RATE: 18 BRPM | OXYGEN SATURATION: 96 % | HEIGHT: 75 IN | DIASTOLIC BLOOD PRESSURE: 80 MMHG | TEMPERATURE: 98.3 F | WEIGHT: 243 LBS | SYSTOLIC BLOOD PRESSURE: 117 MMHG | BODY MASS INDEX: 30.21 KG/M2 | HEART RATE: 83 BPM

## 2020-02-21 DIAGNOSIS — C92.00 ACUTE MYELOID LEUKEMIA NOT HAVING ACHIEVED REMISSION (HCC): Primary | ICD-10-CM

## 2020-02-21 LAB
ABO + RH BLD: NORMAL
BLD PROD TYP BPU: NORMAL
BLD PROD TYP BPU: NORMAL
BLOOD GROUP ANTIBODIES SERPL: NORMAL
BPU ID: NORMAL
BPU ID: NORMAL
CROSSMATCH RESULT,%XM: NORMAL
SPECIMEN EXP DATE BLD: NORMAL
STATUS OF UNIT,%ST: NORMAL
STATUS OF UNIT,%ST: NORMAL
UNIT DIVISION, %UDIV: 0
UNIT DIVISION, %UDIV: 0

## 2020-02-21 NOTE — PROGRESS NOTES
Pt is here for hospital follow up, he is here with his family for results. He reports feeling nauseated, weak, not eating well, no pain. Visit Vitals  /80   Pulse 83   Temp 98.3 °F (36.8 °C)   Resp 18   Ht 6' 3\" (1.905 m)   Wt 243 lb (110.2 kg)   SpO2 96%   BMI 30.37 kg/m²       Pain Scale: 0 - No pain/10  Pain Location:     1. Have you been to the ER, urgent care clinic since your last visit? Hospitalized since your last visit? No      2. Have you seen or consulted any other health care providers outside of the [address] [place] [address] since your last visit? Include any pap smears or colon screening.   No    Health Maintenance reviewed -

## 2020-02-21 NOTE — PROGRESS NOTES
[facility] at [address], [address], [address]: [phone]  F: [phone]    Reason for Visit:   Farooq Pascal is a 62 y.o. male who is seen in consultation at the request of Dr. Osman To for evaluation of anemia and thrombocytopenia. Treatment History:   · Bone marrow aspiration and biopsy 2/17/2020    History of Present Illness: The patient is a very pleasant 49-year-old gentleman who presents with a least a 1 week history of increasing fatigue he did start some iron pills about 6 months ago which makes him wonder if things really did not start much before the present time but over the last week he is gotten much more symptomatic and that when he got back from [place] on Friday night he just knew he had to go to the emergency room and get some help at which time he was found to be significantly anemic with thrombocytopenia and for that reason was admitted. The patient has not had any bleeding or bruising. Patient has not had any hematemesis melena hematochezia hemoptysis or hematuria. He has had some dark stools and they were thought to be dark because of his iron supplementation. He has had some constipation and has had to use a suppository. The patient in general has done very well he considers himself to be very healthy he continues to work full-time he recently changed jobs and his insurance changed as well he is last physical exam was about a year ago time. The patient has lost a little bit of weight he has been trying to get lost about 4 pounds and then in the last week he really ate very little so he lost more over the last week. He denied any other symptomatology denies any other GI  musculoskeletal respiratory or cardiac issues. The patient came to the office today in follow-up and we went over the results of the bone marrow he has 100% cellularity. He is given a diagnosis of AML by [facility].   We are in the process of making sure that his pathology report is faxed to [facility]. The patient will be admitted to [facility] today to get ready for induction therapy. The genetic material and mutations are not back at this point. Patient understands that he is probably can end up in the hospital for several weeks. His daughter who works on the oncology unit is well aware of the situation. Family is having a little stressed out dealing with the all of the issues that are going on. The patient seems to be handling things reasonably well. He is going to go home and get his suitcase and plan on going to [facility] for admission today. We talked about the fact that I will be here to help in any way that we can. We also talked about the fact that we do not induce acute leukemic severe and that he needs to be at [facility] for this process. The patient wants to get back to work. He wants to know how long he will be out of work. I told him that these will be topics that will have to be decided upon at [facility] after he has gone through induction. No past medical history on file. No past surgical history on file. Social History     Tobacco Use    Smoking status: Former Smoker    Smokeless tobacco: Never Used   Substance Use Topics    Alcohol use: Yes     Frequency: Never     Comment: Rare      No family history on file. Current Outpatient Medications   Medication Sig    ondansetron hcl (ZOFRAN) 8 mg tablet Take 1 Tab by mouth every eight (8) hours as needed for Nausea or Vomiting.  ondansetron hcl (ZOFRAN) 4 mg tablet Take 1 Tab by mouth every eight (8) hours as needed for Nausea or Vomiting.  pantoprazole (PROTONIX) 40 mg tablet Take 1 Tab by mouth daily.  nebivolol (BYSTOLIC) 5 mg tablet Take 5 mg by mouth daily. Indications: high blood pressure    traMADol (ULTRAM) 50 mg tablet Take 50 mg by mouth every six (6) hours as needed for Pain.  dorzolamide HCl/timolol maleat (DORZOLAMIDE-TIMOLOL OP) Apply 1 Drop to eye two (2) times a day. One drop each eye 2.23%/0.68%     No current facility-administered medications for this visit. Allergies   Allergen Reactions    Codeine Nausea and Vomiting        Review of Systems: A complete review of systems was obtained, negative except as described above. Physical Exam:     Visit Vitals  /80   Pulse 83   Temp 98.3 °F (36.8 °C)   Resp 18   Ht 6' 3\" (1.905 m)   Wt 243 lb (110.2 kg)   SpO2 96%   BMI 30.37 kg/m²     ECOG PS: 0  General: No distress, agitated  Eyes: PERRLA, anicteric sclerae  HENT: Atraumatic, OP clear  Neck: Supple  Lymphatic: No cervical, supraclavicular, or inguinal adenopathy  Respiratory:  normal respiratory effort  MS: Digits without clubbing or cyanosis. Skin: No rashes, ecchymoses, or petechiae. Normal temperature, turgor, and texture. Psych: Alert, oriented, appropriate affect, normal judgment/insight    Results:     Lab Results   Component Value Date/Time    WBC 3.8 (L) 02/20/2020 09:20 AM    HGB 7.3 (L) 02/20/2020 09:20 AM    HCT 21.5 (L) 02/20/2020 09:20 AM    PLATELET 12 (LL) 65/46/6846 09:20 AM    MCV 88.1 02/20/2020 09:20 AM    ABS. NEUTROPHILS 2.7 02/20/2020 09:20 AM     Lab Results   Component Value Date/Time    Sodium 140 02/16/2020 04:35 AM    Potassium 3.9 02/16/2020 04:35 AM    Chloride 111 (H) 02/16/2020 04:35 AM    CO2 24 02/16/2020 04:35 AM    Glucose 102 (H) 02/16/2020 04:35 AM    BUN 12 02/16/2020 04:35 AM    Creatinine 1.06 02/16/2020 04:35 AM    GFR est AA >60 02/16/2020 04:35 AM    GFR est non-AA >60 02/16/2020 04:35 AM    Calcium 7.7 (L) 02/16/2020 04:35 AM     Lab Results   Component Value Date/Time    Bilirubin, total 1.0 02/15/2020 05:46 PM    ALT (SGPT) 37 02/15/2020 08:43 AM    AST (SGOT) 19 02/15/2020 08:43 AM    Alk. phosphatase 89 02/15/2020 08:43 AM    Protein, total 7.3 02/15/2020 08:43 AM    Albumin 3.7 02/15/2020 08:43 AM    Globulin 3.6 02/15/2020 08:43 AM         Records reviewed and summarized above.   Pathology report(s) reviewed above. Radiology report(s) reviewed above. 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 Bone marrow aspiration and biopsy was 2/17/2020. With 100% cellularity  [facility] has given a diagnosis of AML. Genetic results are pending    2) Splenomegaly  Probably secondary to AML    3)Psychosocial  The patient has a very supportive family  Has many relatives in medical field. Plan:     1. The patient will be admitted to [facility] today to get ready for induction therapy for his AML. I appreciate the opportunity to participate in Mr. Christina Grubbs care.     Signed By: Chioma Byrne NP